# Patient Record
Sex: FEMALE | Race: BLACK OR AFRICAN AMERICAN | Employment: UNEMPLOYED | URBAN - METROPOLITAN AREA
[De-identification: names, ages, dates, MRNs, and addresses within clinical notes are randomized per-mention and may not be internally consistent; named-entity substitution may affect disease eponyms.]

---

## 2017-01-03 ENCOUNTER — TELEPHONE (OUTPATIENT)
Dept: PEDIATRICS | Facility: CLINIC | Age: 47
End: 2017-01-03

## 2017-01-03 DIAGNOSIS — M54.50 LEFT-SIDED LOW BACK PAIN WITHOUT SCIATICA, UNSPECIFIED CHRONICITY: Primary | ICD-10-CM

## 2017-01-03 NOTE — TELEPHONE ENCOUNTER
Would not recommend taking Aleve while on plavix due to bleeding risk.  Tylenol is OK.  Would recommend physical therapy-ordered  Pt should schedule foillow-up if not improving after a few sessions of PT

## 2017-01-03 NOTE — TELEPHONE ENCOUNTER
Patient reports that she had a fall one year ago,and she landed on her buttocks.  Since the fall has noted low to mid back pain on left side of her back that comes and goes.  Also notes pain in left leg at times if she sits too long.  Has tried Tylenol and Aleve-two tabs daily if having pain.  Reports that the Aleve works fairly well.  Patient is on Plavix 75 mg daily.  Is there something else she could try that would offer pain relief, but not make her too sleepy.  Flexeril made her sleepy.  If not, she will continue with the Tylenol and Aleve.  LATISHA Cruz RN

## 2017-01-11 ENCOUNTER — THERAPY VISIT (OUTPATIENT)
Dept: PHYSICAL THERAPY | Facility: CLINIC | Age: 47
End: 2017-01-11
Payer: COMMERCIAL

## 2017-01-11 DIAGNOSIS — M54.50 LUMBAGO: Primary | ICD-10-CM

## 2017-01-11 PROCEDURE — G8978 MOBILITY CURRENT STATUS: HCPCS | Mod: GP | Performed by: PHYSICAL THERAPIST

## 2017-01-11 PROCEDURE — 97161 PT EVAL LOW COMPLEX 20 MIN: CPT | Mod: GP | Performed by: PHYSICAL THERAPIST

## 2017-01-11 PROCEDURE — G8979 MOBILITY GOAL STATUS: HCPCS | Mod: GP | Performed by: PHYSICAL THERAPIST

## 2017-01-11 PROCEDURE — 97110 THERAPEUTIC EXERCISES: CPT | Mod: GP | Performed by: PHYSICAL THERAPIST

## 2017-01-11 NOTE — PROGRESS NOTES
Subjective:                                       Pertinent medical history includes:  Diabetes, overweight, high blood pressure, stroke, anemia and other.    Other surgeries include:  Orthopedic surgery.  Current medications:  High blood pressure medication.                  Oswestry Score: 37.78 %                 Objective:    System    Physical Exam    General     ROS    Assessment/Plan:

## 2017-01-11 NOTE — Clinical Note
DEPARTMENT OF HEALTH AND HUMAN SERVICES  CENTERS FOR MEDICARE & MEDICAID SERVICES    PLAN/UPDATED PLAN OF PROGRESS FOR OUTPATIENT REHABILITATION    PATIENTS NAME:  Kelly Polk   : 1970  PR[facility] NUMBER:    [id number]  [id number]  PROVIDER NAME: [employer] [facility]  MEDICAL RECORD NUMBER: [id number]   START OF CARE DATE:  SOC Date: 17   TYPE:  PT  PRIMARY/TREATMENT DIAGNOSIS: (Pertinent Medical Diagnosis)  Lumbago  VISITS FROM START OF CARE:  Rxs Used: 1     [facility] [employer] [facility] Initial Evaluation    Subjective:  Kelly Polk is a 46 year old female with a lumbar condition.  Condition occurred with:  A fall/slip.  Condition occurred: in the community.  This is a chronic condition  Patient reports she has had low back pain since she fell on 2015 landing on her buttock.  Patient c/o pain with prolong sitting and standing.  Patient had therapy last year for treatment of low back pain.  Patient has left side hemiplegia d/t a stroke .    Patient reports pain:  Central lumbar spine.  Radiates to:  No radiation.  Pain is described as aching and is intermittent and reported as 3/10.  Associated symptoms:  Loss of motion/stiffness.   Symptoms are exacerbated by sitting and standing and relieved by activity/movement, rest and analgesics. Physical therapy order date 1/3/2017. Since onset symptoms are gradually improving.  Special testing: none.  Previous treatment includes physical therapy.  There was moderate improvement following previous treatment.  General health as reported by patient is poor.                Pertinent medical history includes:  Diabetes, overweight, high blood pressure, stroke, anemia and other.  Other surgeries include:  Orthopedic surgery.  Current medications: High blood pressure medication.        Oswestry Score: 37.78 %                 Objective:  Gait:  Hemiplegia  Assistive Devices:  Cane  Lumbar/SI Evaluation ROM:    AROM Lumbar:    Flexion:        66% (+/-)  Ext:                    20% (+) - pain and ROM improve with repeated movement   Side Bend:        Left:     Right:   Rotation:           Left:     Right:   Side Glide:        Left:     Right:         Strength: Poor core strength  Lumbar Myotomes:  not assessed          Kelly Polk                 Assessment/Plan:    Patient is a 46 year old female with lumbar complaints.    Patient has the following significant findings with corresponding treatment plan.                Diagnosis 1:  Lumbar derangement  Pain -  self management, education and home program  Decreased ROM/flexibility - therapeutic exercise, therapeutic activity and home program  Decreased strength - therapeutic exercise, therapeutic activities and home program  Impaired muscle performance - neuro re-education and home program  Decreased function - therapeutic activities and home program    Therapy Evaluation Codes:   1) History comprised of:   Personal factors that impact the plan of care:      None.    Comorbidity factors that impact the plan of care are:      Stroke.     Medications impacting care: High blood pressure and Heparin/coumadin.  2) Examination of Body Systems comprised of:   Body structures and functions that impact the plan of care:      L side hemiplegia.   Activity limitations that impact the plan of care are:      Bending, Dressing, Grasping, Lifting, Squatting/kneeling, Stairs, Standing and Walking.  3) Clinical presentation characteristics are:   Stable/Uncomplicated.  4) Decision-Making    Low complexity using standardized patient assessment instrument and/or measureable assessment of functional outcome.  Cumulative Therapy Evaluation is: Low complexity.    Previous and current functional limitations:  (See Goal Flow Sheet for this information)    Short term and Long term goals: (See Goal Flow Sheet for this information)   Communication ability:  Patient appears to be able to clearly communicate and  "understand verbal and written communication and follow directions correctly.  Treatment Explanation - The following has been discussed with the patient:   RX ordered/plan of care  Anticipated outcomes  Possible risks and side effects  This patient would benefit from PT intervention to resume normal activities.   Rehab potential is good.  Frequency:  1 X week, once daily  Duration:  for 6 weeks  Discharge Plan:  Achieve all LTG.        Kelly Polk           Independent in home treatment program.  Reach maximal therapeutic benefit.    Please refer to the daily flowsheet for treatment today, total treatment time and time spent performing 1:1 timed codes.     Caregiver Signature/Credentials _____________________________ Date ________       Treating Provider: Kosta Mcdaniel PT   I have reviewed and certified the need for these services and plan of treatment while under my care.        PHYSICIAN'S SIGNATURE:   _________________________________________  Date___________   Willis Chen    Certification period:  Beginning of Cert date period: 01/11/17 to  End of Cert period date: 04/10/17     Functional Level Progress Report: Please see attached \"Goal Flow sheet for Functional level.\"    ____X____ Continue Services or       ________ DC Services                Service dates: From  SOC Date: 01/11/17 date to present                           "

## 2017-01-11 NOTE — PROGRESS NOTES
[facility] Initial Evaluation      Subjective:    Kelly Polk is a 46 year old female with a lumbar condition.  Condition occurred with:  A fall/slip.  Condition occurred: in the community.  This is a chronic condition  Patient reports she has had low back pain since she fell on 12/19/2015 landing on her buttock.  Patient c/o pain with prolong sitting and standing.  Patient had therapy last year for treatment of low back pain.  Patient has left side hemiplegia d/t a stroke 2012.  Physical therapy order date 1/3/2017.    Patient reports pain:  Central lumbar spine.  Radiates to:  No radiation.  Pain is described as aching and is intermittent and reported as 3/10.  Associated symptoms:  Loss of motion/stiffness.   Symptoms are exacerbated by sitting and standing and relieved by activity/movement, rest and analgesics.  Since onset symptoms are gradually improving.  Special testing: none.  Previous treatment includes physical therapy.  There was moderate improvement following previous treatment.  General health as reported by patient is poor.                                            Objective:      Gait:  Hemiplegia  Assistive Devices:  Cane                 Lumbar/SI Evaluation  ROM:    AROM Lumbar:   Flexion:        66% (+/-)  Ext:                    20% (+) - pain and ROM improve with repeated movement   Side Bend:        Left:     Right:   Rotation:           Left:     Right:   Side Glide:        Left:     Right:         Strength: Poor core strength  Lumbar Myotomes:  not assessed                                                                         General     ROS    Assessment/Plan:      Patient is a 46 year old female with lumbar complaints.    Patient has the following significant findings with corresponding treatment plan.                Diagnosis 1:  Lumbar derangement  Pain -  self management, education and home program  Decreased ROM/flexibility - therapeutic exercise, therapeutic  activity and home program  Decreased strength - therapeutic exercise, therapeutic activities and home program  Impaired muscle performance - neuro re-education and home program  Decreased function - therapeutic activities and home program    Therapy Evaluation Codes:   1) History comprised of:   Personal factors that impact the plan of care:      None.    Comorbidity factors that impact the plan of care are:      Stroke.     Medications impacting care: High blood pressure and Heparin/coumadin.  2) Examination of Body Systems comprised of:   Body structures and functions that impact the plan of care:      L side hemiplegia.   Activity limitations that impact the plan of care are:      Bending, Dressing, Grasping, Lifting, Squatting/kneeling, Stairs, Standing and Walking.  3) Clinical presentation characteristics are:   Stable/Uncomplicated.  4) Decision-Making    Low complexity using standardized patient assessment instrument and/or measureable assessment of functional outcome.  Cumulative Therapy Evaluation is: Low complexity.    Previous and current functional limitations:  (See Goal Flow Sheet for this information)    Short term and Long term goals: (See Goal Flow Sheet for this information)     Communication ability:  Patient appears to be able to clearly communicate and understand verbal and written communication and follow directions correctly.  Treatment Explanation - The following has been discussed with the patient:   RX ordered/plan of care  Anticipated outcomes  Possible risks and side effects  This patient would benefit from PT intervention to resume normal activities.   Rehab potential is good.    Frequency:  1 X week, once daily  Duration:  for 6 weeks  Discharge Plan:  Achieve all LTG.  Independent in home treatment program.  Reach maximal therapeutic benefit.    Please refer to the daily flowsheet for treatment today, total treatment time and time spent performing 1:1 timed codes.

## 2017-01-18 ENCOUNTER — THERAPY VISIT (OUTPATIENT)
Dept: PHYSICAL THERAPY | Facility: CLINIC | Age: 47
End: 2017-01-18
Payer: COMMERCIAL

## 2017-01-18 DIAGNOSIS — M54.50 LUMBAGO: Primary | ICD-10-CM

## 2017-01-18 PROCEDURE — 97110 THERAPEUTIC EXERCISES: CPT | Mod: GP | Performed by: PHYSICAL THERAPIST

## 2017-01-18 PROCEDURE — 97112 NEUROMUSCULAR REEDUCATION: CPT | Mod: GP | Performed by: PHYSICAL THERAPIST

## 2017-01-25 ENCOUNTER — THERAPY VISIT (OUTPATIENT)
Dept: PHYSICAL THERAPY | Facility: CLINIC | Age: 47
End: 2017-01-25
Payer: COMMERCIAL

## 2017-01-25 DIAGNOSIS — M54.50 LUMBAGO: Primary | ICD-10-CM

## 2017-01-25 PROCEDURE — 97110 THERAPEUTIC EXERCISES: CPT | Mod: GP | Performed by: PHYSICAL THERAPIST

## 2017-01-25 PROCEDURE — 97112 NEUROMUSCULAR REEDUCATION: CPT | Mod: GP | Performed by: PHYSICAL THERAPIST

## 2017-02-01 ENCOUNTER — THERAPY VISIT (OUTPATIENT)
Dept: PHYSICAL THERAPY | Facility: CLINIC | Age: 47
End: 2017-02-01
Payer: COMMERCIAL

## 2017-02-01 DIAGNOSIS — M54.50 LUMBAGO: Primary | ICD-10-CM

## 2017-02-01 PROCEDURE — 97112 NEUROMUSCULAR REEDUCATION: CPT | Mod: GP | Performed by: PHYSICAL THERAPIST

## 2017-02-01 PROCEDURE — 97110 THERAPEUTIC EXERCISES: CPT | Mod: GP | Performed by: PHYSICAL THERAPIST

## 2017-02-07 DIAGNOSIS — E11.40 TYPE 2 DIABETES MELLITUS WITH DIABETIC NEUROPATHY, WITHOUT LONG-TERM CURRENT USE OF INSULIN (H): Primary | ICD-10-CM

## 2017-02-07 NOTE — TELEPHONE ENCOUNTER
This medication was given 90 days at a time, and falls off of the list with each refill.   Please sign if ok, due for 6 month check in march.  Blanca Ann, RN  Triage Nurse

## 2017-02-07 NOTE — TELEPHONE ENCOUNTER
sitagliptin (JANUVIA) 50 MG TABLET - MEDICATION WAS IN HISTORY/DISCONTINUED  Last Written Prescription Date: 1/22/16  Last Fill Quantity: 90, # refills: 0  Last Office Visit with G, P or [facility] prescribing provider:  9/28/16        BP Readings from Last 3 Encounters:   09/28/16 126/68   09/14/16 144/91   08/31/16 134/84     MICROL     3050   1/27/2016  No results found for this basename: microalbumin  CREATININE   Date Value Ref Range Status   09/28/2016 1.30* 0.52 - 1.04 mg/dL Final   ]  GFR ESTIMATE   Date Value Ref Range Status   09/28/2016 44* >60 mL/min/1.7m2 Final     Comment:     Non  GFR Calc   05/17/2016 47* >60 mL/min/1.7m2 Final     Comment:     Non  GFR Calc   02/19/2016 51* >60 mL/min/1.7m2 Final     Comment:     Non  GFR Calc     GFR ESTIMATE IF BLACK   Date Value Ref Range Status   09/28/2016 54* >60 mL/min/1.7m2 Final     Comment:      GFR Calc   05/17/2016 57* >60 mL/min/1.7m2 Final     Comment:      GFR Calc   02/19/2016 62 >60 mL/min/1.7m2 Final     Comment:      GFR Calc     CHOL      179   8/12/2016  HDL       55   8/12/2016  LDL       93   8/12/2016  TRIG      153   8/12/2016  CHOLHDLRATIO      3.3   8/12/2016  AST       23   9/28/2016  ALT       22   9/28/2016  A1C      6.2   9/28/2016  A1C      6.9   9/15/2016  A1C      6.2   5/17/2016  A1C      7.1   1/27/2016  A1C      7.6   10/7/2015  POTASSIUM   Date Value Ref Range Status   09/28/2016 4.1 3.4 - 5.3 mmol/L Final

## 2017-02-15 ENCOUNTER — THERAPY VISIT (OUTPATIENT)
Dept: PHYSICAL THERAPY | Facility: CLINIC | Age: 47
End: 2017-02-15
Payer: COMMERCIAL

## 2017-02-15 DIAGNOSIS — M54.50 LUMBAGO: ICD-10-CM

## 2017-02-15 PROCEDURE — 97112 NEUROMUSCULAR REEDUCATION: CPT | Mod: GP | Performed by: PHYSICAL THERAPIST

## 2017-02-15 PROCEDURE — 97110 THERAPEUTIC EXERCISES: CPT | Mod: GP | Performed by: PHYSICAL THERAPIST

## 2017-03-01 ENCOUNTER — THERAPY VISIT (OUTPATIENT)
Dept: PHYSICAL THERAPY | Facility: CLINIC | Age: 47
End: 2017-03-01
Payer: COMMERCIAL

## 2017-03-01 DIAGNOSIS — M54.50 LUMBAGO: ICD-10-CM

## 2017-03-01 PROCEDURE — 97110 THERAPEUTIC EXERCISES: CPT | Mod: GP | Performed by: PHYSICAL THERAPIST

## 2017-03-01 PROCEDURE — 97112 NEUROMUSCULAR REEDUCATION: CPT | Mod: GP | Performed by: PHYSICAL THERAPIST

## 2017-03-01 NOTE — PROGRESS NOTES
Subjective:    HPI                    Objective:    System    Physical Exam    General     ROS    Assessment/Plan:      DISCHARGE REPORT    Progress reporting period is from 1/12/2017 to 3/1/2017.       SUBJECTIVE  Subjective changes noted by patient:  Patient reports low back pain has improved significantly.   She has less pain with sitting and standing.  Intensity and frequency of pain is less.  HEP is going well.  Walking is impaired d/t hemiplegia.  Current pain level is 0/10  .     Previous pain level was  3/10  .   Changes in function:  Yes (See Goal flowsheet attached for changes in current functional level)  Adverse reaction to treatment or activity: None    OBJECTIVE  AROM Lumbar:   Flexion: 80% supported  Ext: 80% supported      Strength: Fair core strength, improving    ASSESSMENT/PLAN  Updated problem list and treatment plan: Diagnosis 1:  Lumbar derangement  Decreased ROM/flexibility - home program  Decreased strength - home program  Impaired muscle performance - home program  Decreased function - home program  STG/LTGs have been met or progress has been made towards goals:  Yes (See Goal flow sheet completed today.)  Assessment of Progress: The patient's condition is improving.  The patient has met all of their long term goals.  Self Management Plans:  Patient has been instructed in a home treatment program.  Patient is independent in a home treatment program.    Kelly continues to require the following intervention to meet STG and LTG's:  PT intervention is no longer required to meet STG/LTG.    Recommendations:  This patient is ready to be discharged from therapy and continue their home treatment program.    Please refer to the daily flowsheet for treatment today, total treatment time and time spent performing 1:1 timed codes.

## 2017-03-08 DIAGNOSIS — E11.40 TYPE 2 DIABETES MELLITUS WITH DIABETIC NEUROPATHY, WITHOUT LONG-TERM CURRENT USE OF INSULIN (H): Primary | ICD-10-CM

## 2017-03-08 NOTE — TELEPHONE ENCOUNTER
metFORMIN (GLUCOPHAGE-XR) 500 MG 24 hr tablet  Last Written Prescription Date: 9/28/16  Last Fill Quantity: 90, # refills: 1  Last Office Visit with G, P or [facility] prescribing provider:  9/28/16        BP Readings from Last 3 Encounters:   09/28/16 126/68   09/14/16 (!) 144/91   08/31/16 134/84     Lab Results   Component Value Date    MICROL 3050 01/27/2016     No results found for: MICROALBUMIN  Creatinine   Date Value Ref Range Status   09/28/2016 1.30 (H) 0.52 - 1.04 mg/dL Final   ]  GFR Estimate   Date Value Ref Range Status   09/28/2016 44 (L) >60 mL/min/1.7m2 Final     Comment:     Non  GFR Calc   05/17/2016 47 (L) >60 mL/min/1.7m2 Final     Comment:     Non  GFR Calc   02/19/2016 51 (L) >60 mL/min/1.7m2 Final     Comment:     Non  GFR Calc     GFR Estimate If Black   Date Value Ref Range Status   09/28/2016 54 (L) >60 mL/min/1.7m2 Final     Comment:      GFR Calc   05/17/2016 57 (L) >60 mL/min/1.7m2 Final     Comment:      GFR Calc   02/19/2016 62 >60 mL/min/1.7m2 Final     Comment:      GFR Calc     Lab Results   Component Value Date    CHOL 179 08/12/2016     Lab Results   Component Value Date    HDL 55 08/12/2016     Lab Results   Component Value Date    LDL 93 08/12/2016     Lab Results   Component Value Date    TRIG 153 08/12/2016     Lab Results   Component Value Date    CHOLHDLRATIO 3.3 08/12/2016     Lab Results   Component Value Date    AST 23 09/28/2016     Lab Results   Component Value Date    ALT 22 09/28/2016     Lab Results   Component Value Date    A1C 6.2 09/28/2016    A1C 6.9 09/15/2016    A1C 6.2 05/17/2016    A1C 7.1 01/27/2016    A1C 7.6 10/07/2015     Potassium   Date Value Ref Range Status   09/28/2016 4.1 3.4 - 5.3 mmol/L Final

## 2017-03-09 RX ORDER — METFORMIN HCL 500 MG
500 TABLET, EXTENDED RELEASE 24 HR ORAL
Qty: 90 TABLET | Refills: 0 | Status: SHIPPED | OUTPATIENT
Start: 2017-03-09 | End: 2017-03-27

## 2017-03-09 NOTE — TELEPHONE ENCOUNTER
Routing refill request to provider for review/approval because:  Labs out of range:  Cr, GFR. Due for 6 month diabetes check?  Please advise.  Blanca Ann, ANNI  Triage Nurse

## 2017-03-14 ENCOUNTER — TRANSFERRED RECORDS (OUTPATIENT)
Dept: HEALTH INFORMATION MANAGEMENT | Facility: CLINIC | Age: 47
End: 2017-03-14

## 2017-03-17 DIAGNOSIS — I10 ESSENTIAL HYPERTENSION: ICD-10-CM

## 2017-03-17 DIAGNOSIS — I63.9 CEREBROVASCULAR ACCIDENT (CVA), UNSPECIFIED MECHANISM (H): Primary | ICD-10-CM

## 2017-03-17 NOTE — TELEPHONE ENCOUNTER
amLODIPine (NORVASC) 10 MG tablet (Discontinued)      Last Written Prescription Date: 02-  Last Fill Quantity: 90, # refills: 4    Last Office Visit with [facility], [facility] or [facility] prescribing provider:  09-   Future Office Visit:        BP Readings from Last 3 Encounters:   09/28/16 126/68   09/14/16 (!) 144/91   08/31/16 134/84

## 2017-03-21 RX ORDER — CLOPIDOGREL BISULFATE 75 MG/1
75 TABLET ORAL DAILY
Qty: 90 TABLET | Refills: 0 | Status: SHIPPED | OUTPATIENT
Start: 2017-03-21 | End: 2017-03-27

## 2017-03-21 RX ORDER — AMLODIPINE BESYLATE 10 MG/1
10 TABLET ORAL DAILY
Qty: 90 TABLET | Refills: 0 | Status: SHIPPED | OUTPATIENT
Start: 2017-03-21 | End: 2017-03-27

## 2017-03-21 NOTE — TELEPHONE ENCOUNTER
clopidogrel (PLAVIX) 75 MG           Last Written Prescription Date: 2/23/2016  Last Fill Quantity: 90, # refills: 3    Last Office Visit with [facility] or [facility] prescribing provider:  9/18/2016   Future Office Visit:       Lab Results   Component Value Date    WBC 11.5 09/07/2016     Lab Results   Component Value Date    RBC 4.65 09/07/2016     Lab Results   Component Value Date    HGB 12.1 09/07/2016     Lab Results   Component Value Date    HCT 37.7 09/07/2016     No components found for: MCT  Lab Results   Component Value Date    MCV 81 09/07/2016     Lab Results   Component Value Date    MCH 26.0 09/07/2016     Lab Results   Component Value Date    MCHC 32.1 09/07/2016     Lab Results   Component Value Date    RDW 14.5 09/07/2016     Lab Results   Component Value Date     09/07/2016     Lab Results   Component Value Date    AST 23 09/28/2016     Lab Results   Component Value Date    ALT 22 09/28/2016     Creatinine   Date Value Ref Range Status   09/28/2016 1.30 (H) 0.52 - 1.04 mg/dL Final   ]

## 2017-03-21 NOTE — TELEPHONE ENCOUNTER
Routing refill request to provider for review/approval because:  Labs not current:  Cr elevated.  Norvasc discontinued. Please sign if ok.  Blanca Ann, RN  Triage Nurse

## 2017-03-27 ENCOUNTER — OFFICE VISIT (OUTPATIENT)
Dept: PEDIATRICS | Facility: CLINIC | Age: 47
End: 2017-03-27
Payer: COMMERCIAL

## 2017-03-27 VITALS
WEIGHT: 199 LBS | TEMPERATURE: 98.3 F | DIASTOLIC BLOOD PRESSURE: 80 MMHG | HEIGHT: 63 IN | BODY MASS INDEX: 35.26 KG/M2 | HEART RATE: 93 BPM | OXYGEN SATURATION: 99 % | SYSTOLIC BLOOD PRESSURE: 124 MMHG

## 2017-03-27 DIAGNOSIS — Z86.2 HISTORY OF IRON DEFICIENCY ANEMIA: ICD-10-CM

## 2017-03-27 DIAGNOSIS — G81.94 LEFT HEMIPLEGIA (H): ICD-10-CM

## 2017-03-27 DIAGNOSIS — I10 ESSENTIAL HYPERTENSION WITH GOAL BLOOD PRESSURE LESS THAN 140/90: ICD-10-CM

## 2017-03-27 DIAGNOSIS — I63.9 CEREBROVASCULAR ACCIDENT (CVA), UNSPECIFIED MECHANISM (H): ICD-10-CM

## 2017-03-27 DIAGNOSIS — E78.5 HYPERLIPIDEMIA WITH TARGET LDL LESS THAN 100: ICD-10-CM

## 2017-03-27 DIAGNOSIS — E11.40 TYPE 2 DIABETES MELLITUS WITH DIABETIC NEUROPATHY, WITHOUT LONG-TERM CURRENT USE OF INSULIN (H): Primary | ICD-10-CM

## 2017-03-27 LAB — HBA1C MFR BLD: 6.2 % (ref 4.3–6)

## 2017-03-27 PROCEDURE — 99214 OFFICE O/P EST MOD 30 MIN: CPT | Performed by: INTERNAL MEDICINE

## 2017-03-27 PROCEDURE — 36415 COLL VENOUS BLD VENIPUNCTURE: CPT | Performed by: INTERNAL MEDICINE

## 2017-03-27 PROCEDURE — 83036 HEMOGLOBIN GLYCOSYLATED A1C: CPT | Performed by: INTERNAL MEDICINE

## 2017-03-27 PROCEDURE — 80053 COMPREHEN METABOLIC PANEL: CPT | Performed by: INTERNAL MEDICINE

## 2017-03-27 PROCEDURE — 82043 UR ALBUMIN QUANTITATIVE: CPT | Performed by: INTERNAL MEDICINE

## 2017-03-27 RX ORDER — LISINOPRIL 20 MG/1
20 TABLET ORAL DAILY
Qty: 90 TABLET | Refills: 3 | Status: SHIPPED | OUTPATIENT
Start: 2017-03-27 | End: 2017-04-13

## 2017-03-27 RX ORDER — GLIMEPIRIDE 2 MG/1
2 TABLET ORAL
Qty: 90 TABLET | Refills: 0 | Status: SHIPPED | OUTPATIENT
Start: 2017-03-27 | End: 2017-06-28

## 2017-03-27 RX ORDER — FERROUS SULFATE 325(65) MG
325 TABLET ORAL 2 TIMES DAILY
Qty: 60 TABLET | Refills: 2 | Status: CANCELLED | OUTPATIENT
Start: 2017-03-27

## 2017-03-27 RX ORDER — CLOPIDOGREL BISULFATE 75 MG/1
75 TABLET ORAL DAILY
Qty: 90 TABLET | Refills: 3 | Status: SHIPPED | OUTPATIENT
Start: 2017-03-27 | End: 2018-02-09

## 2017-03-27 RX ORDER — ATORVASTATIN CALCIUM 10 MG/1
10 TABLET, FILM COATED ORAL DAILY
Qty: 90 TABLET | Refills: 3 | Status: SHIPPED | OUTPATIENT
Start: 2017-03-27 | End: 2017-07-02

## 2017-03-27 RX ORDER — METFORMIN HCL 500 MG
500 TABLET, EXTENDED RELEASE 24 HR ORAL
Qty: 90 TABLET | Refills: 0 | Status: SHIPPED | OUTPATIENT
Start: 2017-03-27 | End: 2017-06-28

## 2017-03-27 RX ORDER — AMLODIPINE BESYLATE 10 MG/1
10 TABLET ORAL DAILY
Qty: 90 TABLET | Refills: 3 | Status: SHIPPED | OUTPATIENT
Start: 2017-03-27 | End: 2018-02-09

## 2017-03-27 RX ORDER — METHYLDOPA 250 MG/1
250 TABLET, FILM COATED ORAL 3 TIMES DAILY
Qty: 180 TABLET | Refills: 1 | Status: CANCELLED | OUTPATIENT
Start: 2017-03-27

## 2017-03-27 NOTE — PROGRESS NOTES
SUBJECTIVE:                                                    Kelly Polk is a 46 year old female with h/o DM and prior CVA with associated left hemiplegia, who presents to clinic today for the following health issues:    Diabetes Follow-up    Patient is checking blood sugars: twice daily.    Results are as follows:         am - 130's    Diabetic concerns: ran out of amaryl     Symptoms of hypoglycemia (low blood sugar): none     Paresthesias (numbness or burning in feet) or sores: Nh/o neuropathy     Lab Results   Component Value Date    A1C 6.2 03/27/2017    A1C 6.2 09/28/2016             Hypertension Follow-up      Outpatient blood pressures are being checked at home.  Results are 120/80    Low Salt Diet: no added salt        Hyperlipidemia Follow-Up      Rate your low fat/cholesterol diet?: fair    Taking statin?  Has not restarted    Other lipid medications/supplements?:  None  Lab Results   Component Value Date    LDL 93 08/12/2016    LDL 66 02/18/2016            Amount of exercise or physical activity: None    Problems taking medications regularly: No    Medication side effects: none    Diet: low salt and low fat/cholesterol      Patient Active Problem List   Diagnosis     Hyperlipidemia with target LDL less than 100     Left hemiplegia (H)     Fibroid uterus     HPV in female     Diabetic nephropathy with proteinuria (H)     Cerebrovascular accident (CVA), unspecified mechanism (H)     Essential hypertension with goal blood pressure less than 140/90     Lumbago     Type 2 diabetes mellitus with diabetic neuropathy, without long-term current use of insulin (H)     No past surgical history on file.    Social History   Substance Use Topics     Smoking status: Never Smoker     Smokeless tobacco: Never Used     Alcohol use No     Family History   Problem Relation Age of Onset     Breast Cancer Mother      Hypertension Brother          Current Outpatient Prescriptions   Medication Sig Dispense Refill      "amLODIPine (NORVASC) 10 MG tablet Take 1 tablet (10 mg) by mouth daily 90 tablet 3     clopidogrel (PLAVIX) 75 MG tablet Take 1 tablet (75 mg) by mouth daily 90 tablet 3     metFORMIN (GLUCOPHAGE-XR) 500 MG 24 hr tablet Take 1 tablet (500 mg) by mouth daily (with dinner) 90 tablet 0     sitagliptin (JANUVIA) 50 MG tablet Take 1 tablet (50 mg) by mouth daily (with breakfast) 90 tablet 0                     blood glucose monitoring (NO BRAND SPECIFIED) test strip Use to test blood sugars 2 times daily or as directed 180 each 0     insulin isophane human (HUMULIN N PEN) 100 UNIT/ML susp Inject 8 Units Subcutaneous At Bedtime 3 Month 1     methyldopa (ALDOMET) 250 MG tablet Take 1 tablet (250 mg) by mouth 3 times daily 180 tablet 1     order for DME Equipment being ordered: left AFO brace 1 Units 0     ASPIRIN NOT PRESCRIBED, INTENTIONAL, 1 each daily Antiplatelet medication not prescribed intentionally due to on Plavix 0 each 0     ferrous sulfate (IRON) 325 (65 FE) MG tablet Take 1 tablet (325 mg) by mouth 2 times daily 60 tablet 2     order for DME Accu-check glucometer test strips or per insurance coverage    Check QAM and  Units 3     order for DME Accu-check glucometer or per insurance coverage 1 Units 0     order for DME Accu-check glucometer test lancets, or per insurance coverage    Check QAM and  Units 3     ROS: The following systems have been completely reviewed and are negative except as noted in the HPI: CONSTITUTIONAL,  CARDIOVASCULAR, PULMONARY    OBJECTIVE:                                                    /80 (Cuff Size: Adult Regular)  Pulse 93  Temp 98.3  F (36.8  C) (Oral)  Ht 5' 3\" (1.6 m)  Wt 199 lb (90.3 kg)  SpO2 99%  BMI 35.25 kg/m2 Body mass index is 35.25 kg/(m^2).  GENERAL:  alert,  no distress  NECK: no tenderness, no adenopathy  RESP: lungs clear to auscultation - no rales, no rhonchi, no wheezes  CV: regular rates and rhythm, normal S1 S2, no S3 or S4 and no " murmur, no click or rub   Neuro: left hemiplegia  MS: extremities- no edema     ASSESSMENT/PLAN:                                                        ICD-10-CM    1. Type 2 diabetes mellitus with diabetic neuropathy, without long-term current use of insulin (H) E11.40 metFORMIN (GLUCOPHAGE-XR) 500 MG 24 hr tablet     sitagliptin (JANUVIA) 50 MG tablet     Hemoglobin A1c     Albumin Random Urine Quantitative     Comprehensive metabolic panel      Well controlled.  Continue metformin, januvia; resume Amaryl.      Prior unplanned pregnancy and SAB.  Pt has since declined tubal ligation but has continued birth control measures and the couple has no plans for additional pregnancies (which would otherwise be high risk).  Next diabetes follow-up 3 months     2. Cerebrovascular accident (CVA), unspecified mechanism (H) I63.9 clopidogrel (PLAVIX) 75 MG tablet        3. Left hemiplegia (H) G81.94 CVA-continue plavix, resume statin     4. Essential hypertension with goal blood pressure less than 140/90 I10 D/c methyldopa.  Start lisinopril 20mg       5. History of iron deficiency anemia Z86.2 Most recent blood counts and ferritin reviewed--d/c iron supplement     6. Hyperlipidemia with target LDL less than 100 E78.5 atorvastatin (LIPITOR) 10 MG tablet     Resume atorvastatin      Willis Chen MD  [facility]

## 2017-03-27 NOTE — NURSING NOTE
"Chief Complaint   Patient presents with     Diabetes       Initial /80 (Cuff Size: Adult Regular)  Pulse 93  Temp 98.3  F (36.8  C) (Oral)  Ht 5' 3\" (1.6 m)  Wt 199 lb (90.3 kg)  SpO2 99%  BMI 35.25 kg/m2 Estimated body mass index is 35.25 kg/(m^2) as calculated from the following:    Height as of this encounter: 5' 3\" (1.6 m).    Weight as of this encounter: 199 lb (90.3 kg).  Medication Reconciliation: kota Harrison CMA    "

## 2017-03-27 NOTE — MR AVS SNAPSHOT
"              After Visit Summary   3/27/2017    Kelly Polk    MRN: [id number]           Patient Information     Date Of Birth          1970        Visit Information        Provider Department      3/27/2017 2:00 PM Willis Chen MD [facility]        Today's Diagnoses     Essential hypertension        Cerebrovascular accident (CVA), unspecified mechanism (H)        Type 2 diabetes mellitus with diabetic neuropathy, without long-term current use of insulin (H)        Essential hypertension with goal blood pressure less than 140/90        History of iron deficiency anemia          Care Instructions    INSTRUCTIONS FOR TODAY:     diabetes: resume Amaryl, continue metformin and Januvia   high blood pressure: stop methyl dopa, resume lisinopril 20mg daily   next diabetes visit in 3 months    Dr Chen          Follow-ups after your visit        Who to contact     If you have questions or need follow up information about today's clinic visit or your schedule please contact [facility] directly at [phone].  Normal or non-critical lab and imaging results will be communicated to you by MyChart, letter or phone within 4 business days after the clinic has received the results. If you do not hear from us within 7 days, please contact the clinic through [employer] or phone. If you have a critical or abnormal lab result, we will notify you by phone as soon as possible.  Submit refill requests through [employer] or call your pharmacy and they will forward the refill request to us. Please allow 3 business days for your refill to be completed.          Additional Information About Your Visit        [employer] Information     [employer] lets you send messages to your doctor, view your test results, renew your prescriptions, schedule appointments and more. To sign up, go to [email or web address] . Click on \"Log in\" on the left side of the screen, which will take you to the Welcome page. Then click on " "\"Sign up Now\" on the right side of the page.     You will be asked to enter the access code listed below, as well as some personal information. Please follow the directions to create your username and password.     Your access code is: [email or web address]  Expires: 2017  2:40 PM     Your access code will  in 90 days. If you need help or a new code, please call your [facility] clinic or [phone].        Care EveryWhere ID     This is your Care EveryWhere ID. This could be used by other organizations to access your [facility] medical records  [id number]        Your Vitals Were     Pulse Temperature Height Pulse Oximetry BMI (Body Mass Index)       93 98.3  F (36.8  C) (Oral) 5' 3\" (1.6 m) 99% 35.25 kg/m2        Blood Pressure from Last 3 Encounters:   17 124/80   16 126/68   16 (!) 144/91    Weight from Last 3 Encounters:   17 199 lb (90.3 kg)   16 199 lb 11.2 oz (90.6 kg)   16 201 lb (91.2 kg)              We Performed the Following     Albumin Random Urine Quantitative     Comprehensive metabolic panel     Hemoglobin A1c          Today's Medication Changes          These changes are accurate as of: 3/27/17  2:40 PM.  If you have any questions, ask your nurse or doctor.               Start taking these medicines.        Dose/Directions    lisinopril 20 MG tablet   Commonly known as:  PRINIVIL/ZESTRIL   Used for:  Essential hypertension   Started by:  Willis Chen MD        Dose:  20 mg   Take 1 tablet (20 mg) by mouth daily   Quantity:  90 tablet   Refills:  3         These medicines have changed or have updated prescriptions.        Dose/Directions    amLODIPine 10 MG tablet   Commonly known as:  NORVASC   This may have changed:  additional instructions   Used for:  Essential hypertension   Changed by:  Willis Chen MD        Dose:  10 mg   Take 1 tablet (10 mg) by mouth daily   Quantity:  90 tablet   Refills:  3       clopidogrel 75 MG tablet   Commonly known " as:  PLAVIX   This may have changed:  additional instructions   Used for:  Cerebrovascular accident (CVA), unspecified mechanism (H)   Changed by:  Willis Chen MD        Dose:  75 mg   Take 1 tablet (75 mg) by mouth daily   Quantity:  90 tablet   Refills:  3       metFORMIN 500 MG 24 hr tablet   Commonly known as:  GLUCOPHAGE-XR   This may have changed:  additional instructions   Used for:  Type 2 diabetes mellitus with diabetic neuropathy, without long-term current use of insulin (H)   Changed by:  Willis Chen MD        Dose:  500 mg   Take 1 tablet (500 mg) by mouth daily (with dinner)   Quantity:  90 tablet   Refills:  0            Where to get your medicines      These medications were sent to [facility] - [address], [facility] [address]     Phone:  [phone]     amLODIPine 10 MG tablet    clopidogrel 75 MG tablet    glimepiride 2 MG tablet    lisinopril 20 MG tablet    metFORMIN 500 MG 24 hr tablet    sitagliptin 50 MG tablet                Primary Care Provider Office Phone # Fax #    Willis Chen [phone]       [address] [place] [address]        Thank you!     Thank you for choosing [facility]  for your care. Our goal is always to provide you with excellent care. Hearing back from our patients is one way we can continue to improve our services. Please take a few minutes to complete the written survey that you may receive in the mail after your visit with us. Thank you!             Your Updated Medication List - Protect others around you: Learn how to safely use, store and throw away your medicines at www.disposemymeds.org.          This list is accurate as of: 3/27/17  2:40 PM.  Always use your most recent med list.                   Brand Name Dispense Instructions for use    amLODIPine 10 MG tablet    NORVASC    90 tablet    Take 1 tablet  (10 mg) by mouth daily       ASPIRIN NOT PRESCRIBED    INTENTIONAL    0 each    1 each daily Antiplatelet medication not prescribed intentionally due to on Plavix       blood glucose monitoring test strip    no brand specified    180 each    Use to test blood sugars 2 times daily or as directed       clopidogrel 75 MG tablet    PLAVIX    90 tablet    Take 1 tablet (75 mg) by mouth daily       ferrous sulfate 325 (65 FE) MG tablet    IRON    60 tablet    Take 1 tablet (325 mg) by mouth 2 times daily       glimepiride 2 MG tablet    AMARYL    90 tablet    Take 1 tablet (2 mg) by mouth every morning (before breakfast)       HumuLIN N  UNIT/ML injection   Generic drug:  insulin isophane human     3 Month    Inject 8 Units Subcutaneous At Bedtime       lisinopril 20 MG tablet    PRINIVIL/ZESTRIL    90 tablet    Take 1 tablet (20 mg) by mouth daily       metFORMIN 500 MG 24 hr tablet    GLUCOPHAGE-XR    90 tablet    Take 1 tablet (500 mg) by mouth daily (with dinner)       methyldopa 250 MG tablet    ALDOMET    180 tablet    Take 1 tablet (250 mg) by mouth 3 times daily       * order for DME     1 Units    Accu-check glucometer or per insurance coverage       * order for DME     180 Units    Accu-check glucometer test lancets, or per insurance coverage  Check QAM and QPM       * order for DME     180 Units    Accu-check glucometer test strips or per insurance coverage  Check QAM and QPM       * order for DME     1 Units    Equipment being ordered: left AFO brace       sitagliptin 50 MG tablet    JANUVIA    90 tablet    Take 1 tablet (50 mg) by mouth daily (with breakfast)       * Notice:  This list has 4 medication(s) that are the same as other medications prescribed for you. Read the directions carefully, and ask your doctor or other care provider to review them with you.

## 2017-03-27 NOTE — PATIENT INSTRUCTIONS
INSTRUCTIONS FOR TODAY:     diabetes: resume Amaryl, continue metformin and Januvia   high blood pressure: stop methyl dopa, resume lisinopril 20mg daily   next diabetes visit in 3 months    Dr Chen

## 2017-03-28 LAB
ALBUMIN SERPL-MCNC: 3.6 G/DL (ref 3.4–5)
ALP SERPL-CCNC: 63 U/L (ref 40–150)
ALT SERPL W P-5'-P-CCNC: 20 U/L (ref 0–50)
ANION GAP SERPL CALCULATED.3IONS-SCNC: 8 MMOL/L (ref 3–14)
AST SERPL W P-5'-P-CCNC: 17 U/L (ref 0–45)
BILIRUB SERPL-MCNC: 0.3 MG/DL (ref 0.2–1.3)
BUN SERPL-MCNC: 15 MG/DL (ref 7–30)
CALCIUM SERPL-MCNC: 8.8 MG/DL (ref 8.5–10.1)
CHLORIDE SERPL-SCNC: 106 MMOL/L (ref 94–109)
CO2 SERPL-SCNC: 26 MMOL/L (ref 20–32)
CREAT SERPL-MCNC: 1.27 MG/DL (ref 0.52–1.04)
CREAT UR-MCNC: 170 MG/DL
GFR SERPL CREATININE-BSD FRML MDRD: 45 ML/MIN/1.7M2
GLUCOSE SERPL-MCNC: 100 MG/DL (ref 70–99)
MICROALBUMIN UR-MCNC: 2440 MG/L
MICROALBUMIN/CREAT UR: 1435.29 MG/G CR (ref 0–25)
POTASSIUM SERPL-SCNC: 4.2 MMOL/L (ref 3.4–5.3)
PROT SERPL-MCNC: 8 G/DL (ref 6.8–8.8)
SODIUM SERPL-SCNC: 140 MMOL/L (ref 133–144)

## 2017-04-13 ENCOUNTER — TELEPHONE (OUTPATIENT)
Dept: PEDIATRICS | Facility: CLINIC | Age: 47
End: 2017-04-13

## 2017-04-13 DIAGNOSIS — I10 ESSENTIAL HYPERTENSION WITH GOAL BLOOD PRESSURE LESS THAN 140/90: Primary | ICD-10-CM

## 2017-04-13 RX ORDER — LOSARTAN POTASSIUM 50 MG/1
50 TABLET ORAL DAILY
Qty: 30 TABLET | Refills: 1 | Status: SHIPPED | OUTPATIENT
Start: 2017-04-13 | End: 2017-06-28

## 2017-04-13 NOTE — TELEPHONE ENCOUNTER
Script sent for losartan to replace lisinopril.  She can start that when she would next take her lisinopril.   Senia Skinner MD

## 2017-04-13 NOTE — TELEPHONE ENCOUNTER
"Pt notifies the following:     - says that she was on lisinopril in the past & d/c because of the SE(bloated)  - when she stopped lisinopril her bloated feeling went away  -  restarted lisinopril 20 mg qd on 03/27  - has been taking lisinopril for a week now  - has been experiencing severe bloating, nausea & feeling \"sick to my stomach\"   - able to keep food & fluids down  - denies fever, chills, dizziness, HA, vomiting, diarrhea, constipation, cough, hypotension sx's   -  resumed her on Amaryl on 03/27 as well  - pt is planning to d/c lisinopril from tomorrow   - requesting alternate for lisinopril    Pt is willing to wait till Monday for pcp to address. Sending to pcp & covering providers as FYI. Please advise.    Inderjit, RN  Triage Nurse          "

## 2017-04-13 NOTE — TELEPHONE ENCOUNTER
Patient notified and voices understanding of instructions.  She will stop the Lisinopril.  No further questions.  Will call back if any other questions or concerns.  LATISHA Cruz RN

## 2017-04-13 NOTE — TELEPHONE ENCOUNTER
Reviewed chart. Has been on multiple different BP meds in the past. Would be best to wait for Dr. Chen. If she has some methyl dopa left, can restart this until Dr. Chen is able to address next week. If she is out, I'm happy to send a small supply to get her through.    Please let know.  Nohemy Monique MD  Internal Medicine/Pediatrics

## 2017-04-27 ENCOUNTER — TELEPHONE (OUTPATIENT)
Dept: PEDIATRICS | Facility: CLINIC | Age: 47
End: 2017-04-27

## 2017-04-27 NOTE — TELEPHONE ENCOUNTER
Pt is past due for f/u pap smear.  Reminder letter was sent 01/12/17.  Spoke with pt, states she has the clinic number but just hasn't called to schedule yet.  Reiterated the importance of follow up. Pt verbalized understanding.  Sarai Lin,    Pap Tracking

## 2017-06-01 DIAGNOSIS — E11.40 TYPE 2 DIABETES MELLITUS WITH DIABETIC NEUROPATHY, WITHOUT LONG-TERM CURRENT USE OF INSULIN (H): Primary | ICD-10-CM

## 2017-06-01 NOTE — TELEPHONE ENCOUNTER
TEST STRIPS      Last Written Prescription Date: 10/13/2016  Last Fill Quantity: 180,  # refills: 0   Last Office Visit with [facility], [facility] or [facility] prescribing provider: 3/27/2017                                         Next 5 appointments (look out 90 days)     Jun 26, 2017  1:00 PM CDT   Office Visit with Willis Chen MD   [facility] ([facility])    [address] [place] [id number] [phone]

## 2017-06-02 NOTE — TELEPHONE ENCOUNTER
Prescription approved per [facility] Refill Protocol.  Payton Villalobos RN

## 2017-06-05 ENCOUNTER — TRANSFERRED RECORDS (OUTPATIENT)
Dept: HEALTH INFORMATION MANAGEMENT | Facility: CLINIC | Age: 47
End: 2017-06-05

## 2017-06-26 ENCOUNTER — TRANSFERRED RECORDS (OUTPATIENT)
Dept: HEALTH INFORMATION MANAGEMENT | Facility: CLINIC | Age: 47
End: 2017-06-26

## 2017-06-28 ENCOUNTER — OFFICE VISIT (OUTPATIENT)
Dept: PEDIATRICS | Facility: CLINIC | Age: 47
End: 2017-06-28
Payer: COMMERCIAL

## 2017-06-28 VITALS
HEART RATE: 96 BPM | OXYGEN SATURATION: 100 % | SYSTOLIC BLOOD PRESSURE: 118 MMHG | TEMPERATURE: 98.4 F | WEIGHT: 201 LBS | BODY MASS INDEX: 35.61 KG/M2 | DIASTOLIC BLOOD PRESSURE: 74 MMHG

## 2017-06-28 DIAGNOSIS — R60.9 EDEMA, UNSPECIFIED TYPE: ICD-10-CM

## 2017-06-28 DIAGNOSIS — E11.21 TYPE 2 DIABETES MELLITUS WITH DIABETIC NEPHROPATHY, WITHOUT LONG-TERM CURRENT USE OF INSULIN (H): ICD-10-CM

## 2017-06-28 DIAGNOSIS — I10 ESSENTIAL HYPERTENSION WITH GOAL BLOOD PRESSURE LESS THAN 140/90: ICD-10-CM

## 2017-06-28 DIAGNOSIS — I63.9 CEREBROVASCULAR ACCIDENT (CVA), UNSPECIFIED MECHANISM (H): ICD-10-CM

## 2017-06-28 DIAGNOSIS — E78.5 HYPERLIPIDEMIA WITH TARGET LDL LESS THAN 100: ICD-10-CM

## 2017-06-28 DIAGNOSIS — E11.40 TYPE 2 DIABETES MELLITUS WITH DIABETIC NEUROPATHY, WITHOUT LONG-TERM CURRENT USE OF INSULIN (H): Primary | ICD-10-CM

## 2017-06-28 PROCEDURE — 99214 OFFICE O/P EST MOD 30 MIN: CPT | Performed by: INTERNAL MEDICINE

## 2017-06-28 RX ORDER — GLIMEPIRIDE 2 MG/1
2 TABLET ORAL
Qty: 90 TABLET | Refills: 1 | Status: SHIPPED | OUTPATIENT
Start: 2017-06-28 | End: 2017-12-09

## 2017-06-28 RX ORDER — METFORMIN HCL 500 MG
500 TABLET, EXTENDED RELEASE 24 HR ORAL
Qty: 90 TABLET | Refills: 1 | Status: SHIPPED | OUTPATIENT
Start: 2017-06-28 | End: 2018-03-26

## 2017-06-28 RX ORDER — LOSARTAN POTASSIUM 50 MG/1
50 TABLET ORAL DAILY
Qty: 90 TABLET | Refills: 3 | Status: SHIPPED | OUTPATIENT
Start: 2017-06-28 | End: 2018-04-17

## 2017-06-28 NOTE — PROGRESS NOTES
SUBJECTIVE:                                                    Kelly Polk is a 46 year old female who presents to clinic today for the following health issues:    Diabetes Follow-up    Patient is checking blood sugars: once daily.  Results are as follows:         am -     Diabetic concerns: None     Symptoms of hypoglycemia (low blood sugar): none     Paresthesias (numbness or burning in feet) or sores: h/o diabetic neuropathy     Lab Results   Component Value Date    A1C 5.7 06/29/2017    A1C 6.2 03/27/2017     Lab Results   Component Value Date    MICROL 800 06/29/2017    MICROL 2440 03/27/2017     No results found for: MICROALBUMIN       Hyperlipidemia Follow-Up      Rate your low fat/cholesterol diet?: good    Taking statin?  Yes, no muscle aches from statin    Other lipid medications/supplements?:  None  Lab Results   Component Value Date     06/29/2017    LDL 93 08/12/2016           Hypertension Follow-up      Outpatient blood pressures are being checked at home     Low Salt Diet: no added salt    Cerebrovascular Follow-up      Patient history: CVA with left hemiplegia    Residual symptoms: left arm and leg weakness    Worsened or new symptoms since last visit: No    Daily aspirin use: No, on plavix    Hypertension controlled: Yes      Amount of exercise or physical activity: None    Problems taking medications regularly: No    Medication side effects: none    Diet: low salt and low fat/cholesterol      Patient Active Problem List   Diagnosis     Hyperlipidemia with target LDL less than 100     Left hemiplegia (H)     Fibroid uterus     HPV in female     Diabetic nephropathy with proteinuria (H)     Cerebrovascular accident (CVA), unspecified mechanism (H)     Essential hypertension with goal blood pressure less than 140/90     Lumbago     Type 2 diabetes mellitus with diabetic neuropathy, without long-term current use of insulin (H)     No past surgical history on file.    Social History    Substance Use Topics     Smoking status: Never Smoker     Smokeless tobacco: Never Used     Alcohol use No     Family History   Problem Relation Age of Onset     Breast Cancer Mother      Hypertension Brother          Current Outpatient Prescriptions   Medication Sig Dispense Refill     losartan (COZAAR) 50 MG tablet Take 1 tablet (50 mg) by mouth daily 90 tablet 3     metFORMIN (GLUCOPHAGE-XR) 500 MG 24 hr tablet Take 1 tablet (500 mg) by mouth daily (with dinner) 90 tablet 1     sitagliptin (JANUVIA) 50 MG tablet Take 1 tablet (50 mg) by mouth daily (with breakfast) 90 tablet 1     glimepiride (AMARYL) 2 MG tablet Take 1 tablet (2 mg) by mouth every morning (before breakfast) 90 tablet 1     order for DME Equipment being ordered: knee high compression stockings, 30-40mmHg 2 Units 0     blood glucose monitoring (NO BRAND SPECIFIED) test strip Use to test blood sugars 2 times daily or as directed 180 each 0     amLODIPine (NORVASC) 10 MG tablet Take 1 tablet (10 mg) by mouth daily 90 tablet 3     clopidogrel (PLAVIX) 75 MG tablet Take 1 tablet (75 mg) by mouth daily 90 tablet 3             order for DME Equipment being ordered: left AFO brace 1 Units 0     ASPIRIN NOT PRESCRIBED, INTENTIONAL, 1 each daily Antiplatelet medication not prescribed intentionally due to on Plavix 0 each 0     order for DME Accu-check glucometer test strips or per insurance coverage    Check QAM and  Units 3     order for DME Accu-check glucometer or per insurance coverage 1 Units 0     order for DME Accu-check glucometer test lancets, or per insurance coverage    Check QAM and  Units 3     lipitor 10mg          ROS: The following systems have been completely reviewed and are negative except as noted in the HPI: CONSTITUTIONAL,  CARDIOVASCULAR, PULMONARY    OBJECTIVE:                                                    /74 (Cuff Size: Adult Regular)  Pulse 96  Temp 98.4  F (36.9  C) (Oral)  Wt 201 lb (91.2 kg)   SpO2 100%  BMI 35.61 kg/m2 Body mass index is 35.61 kg/(m^2).  GENERAL:  alert,  no distress  NECK: no tenderness, no adenopathy  RESP: lungs clear to auscultation - no rales, no rhonchi, no wheezes  CV: regular rates and rhythm, normal S1 S2  Neuro: left hemiplegia  MS: extremities-  Trace bilateral lower extremity edema     ASSESSMENT/PLAN:                                                        ICD-10-CM    1. Type 2 diabetes mellitus with diabetic neuropathy, without long-term current use of insulin (H) E11.40 metFORMIN (GLUCOPHAGE-XR) 500 MG 24 hr tablet     sitagliptin (JANUVIA) 50 MG tablet     Adequate control.  Continue current regimen without changes.      2. Type 2 diabetes mellitus with diabetic nephropathy, without long-term current use of insulin (H) E11.21 NEPHROLOGY ADULT REFERRAL     Hemoglobin A1c     Albumin Random Urine Quantitative      Significant proteinuria consistent with diabetic nephropathy.   Recommended patient establish care with primary nephrologist     3. Cerebrovascular accident (CVA), unspecified mechanism (H) I63.9  history of CVA and left hemiplegia.  Continue Plavix, plan to optimize lipids,  Blood pressure adequately controlled          4. Essential hypertension with goal blood pressure less than 140/90 I10 losartan (COZAAR) 50 MG tablet       Adequate control.  Continue current regimen without changes.      5. Hyperlipidemia with target LDL less than 100 E78.5 Lipid panel reflex to direct LDL     Comprehensive metabolic panel       LDL above goal, plan to increase atorvastatin to 20 mg repeat fasting lab work 2 months     6. Edema, unspecified type R60.9 order for DME      Trace lower extremity edema likely secondary to calcium channel blocker, recommended support stockings when out of bed      Willis Chen MD  [facility]

## 2017-06-28 NOTE — MR AVS SNAPSHOT
After Visit Summary   6/28/2017    Kelly Polk    MRN: [id number]           Patient Information     Date Of Birth          1970        Visit Information        Provider Department      6/28/2017 9:40 AM Willis Chen MD [facility]        Today's Diagnoses     Type 2 diabetes mellitus with diabetic neuropathy, without long-term current use of insulin (H)    -  1    Type 2 diabetes mellitus with diabetic nephropathy, without long-term current use of insulin (H)        Essential hypertension with goal blood pressure less than 140/90        Cerebrovascular accident (CVA), unspecified mechanism (H)        Hyperlipidemia with target LDL less than 100          Care Instructions    INSTRUCTIONS FOR TODAY:     schedule visit with Nephrology (Dr Levine or Moy)   fasting labwork   follow-up visit in 6 months     Dr Chen            Follow-ups after your visit        Additional Services     NEPHROLOGY ADULT REFERRAL       Your provider has referred you to: TERE: Molly Carcamo [phone]   [email or web address]    Please be aware that coverage of these services is subject to the terms and limitations of your health insurance plan.  Call member services at your health plan with any benefit or coverage questions.      Reason for referral:  Proteinuria > 1 gram/24 hours.   Please bring the following to your appointment:    >>   Any x-rays, CTs or MRIs which have been performed.  Contact the facility where they were done to arrange for  prior to your scheduled appointment.   >>   List of current medications   >>   This referral request   >>   Any documents/labs given to you for this referral                  Future tests that were ordered for you today     Open Future Orders        Priority Expected Expires Ordered    Lipid panel reflex to direct LDL Routine  7/28/2017 6/28/2017    Hemoglobin A1c Routine  7/28/2017 6/28/2017    Comprehensive  "metabolic panel Routine  2017    Albumin Random Urine Quantitative Routine  2017            Who to contact     If you have questions or need follow up information about today's clinic visit or your schedule please contact [facility] [place] directly at [phone].  Normal or non-critical lab and imaging results will be communicated to you by MyChart, letter or phone within 4 business days after the clinic has received the results. If you do not hear from us within 7 days, please contact the clinic through MyChart or phone. If you have a critical or abnormal lab result, we will notify you by phone as soon as possible.  Submit refill requests through [employer] or call your pharmacy and they will forward the refill request to us. Please allow 3 business days for your refill to be completed.          Additional Information About Your Visit        MyChart Information     [employer] lets you send messages to your doctor, view your test results, renew your prescriptions, schedule appointments and more. To sign up, go to [email or web address] . Click on \"Log in\" on the left side of the screen, which will take you to the Welcome page. Then click on \"Sign up Now\" on the right side of the page.     You will be asked to enter the access code listed below, as well as some personal information. Please follow the directions to create your username and password.     Your access code is: [email or web address]  Expires: 2017 10:18 AM     Your access code will  in 90 days. If you need help or a new code, please call your [place] clinic or [phone].        Care EveryWhere ID     This is your Care EveryWhere ID. This could be used by other organizations to access your [place] medical records  [id number]        Your Vitals Were     Pulse Temperature Pulse Oximetry BMI (Body Mass Index)          96 98.4  F (36.9  C) (Oral) 100% 35.61 kg/m2         Blood Pressure from Last 3 Encounters:   17 " 118/74   03/27/17 124/80   09/28/16 126/68    Weight from Last 3 Encounters:   06/28/17 201 lb (91.2 kg)   03/27/17 199 lb (90.3 kg)   09/28/16 199 lb 11.2 oz (90.6 kg)              We Performed the Following     NEPHROLOGY ADULT REFERRAL          Today's Medication Changes          These changes are accurate as of: 6/28/17 10:18 AM.  If you have any questions, ask your nurse or doctor.               Stop taking these medicines if you haven't already. Please contact your care team if you have questions.     ferrous sulfate 325 (65 FE) MG tablet   Commonly known as:  IRON   Stopped by:  Willis Chen MD           HumuLIN N  UNIT/ML injection   Generic drug:  insulin isophane human   Stopped by:  Willis Chen MD           methyldopa 250 MG tablet   Commonly known as:  ALDOMET   Stopped by:  Willis Chen MD                Where to get your medicines      These medications were sent to [facility] - [address], [facility] [address]     Phone:  [phone]     glimepiride 2 MG tablet    losartan 50 MG tablet    metFORMIN 500 MG 24 hr tablet    sitagliptin 50 MG tablet                Primary Care Provider Office Phone # Fax #    Willis Chen [phone]       [address] [place] [address]        Equal Access to Services     [facility] AH: Hadii aad ku hadasho Soomaali, waaxda luqadaha, qaybta kaalmada adeegyada, eve jewell . So [facility] [phone].    ATENCIÓN: Si habla español, tiene a rader disposición servicios gratuitos de asistencia lingüística. James husain [phone].    We comply with applicable federal civil rights laws and [place] laws. We do not discriminate on the basis of race, color, national origin, age, disability sex, sexual orientation or gender identity.            Thank you!     Thank you for choosing [facility]  [place]  for your care. Our goal is always to provide you with excellent care. Hearing back from our patients is one way we can continue to improve our services. Please take a few minutes to complete the written survey that you may receive in the mail after your visit with us. Thank you!             Your Updated Medication List - Protect others around you: Learn how to safely use, store and throw away your medicines at www.disposemymeds.org.          This list is accurate as of: 6/28/17 10:18 AM.  Always use your most recent med list.                   Brand Name Dispense Instructions for use Diagnosis    amLODIPine 10 MG tablet    NORVASC    90 tablet    Take 1 tablet (10 mg) by mouth daily        ASPIRIN NOT PRESCRIBED    INTENTIONAL    0 each    1 each daily Antiplatelet medication not prescribed intentionally due to on Plavix    Type 2 diabetes mellitus with hyperglycemia (H)       atorvastatin 10 MG tablet    LIPITOR    90 tablet    Take 1 tablet (10 mg) by mouth daily    Hyperlipidemia with target LDL less than 100       blood glucose monitoring test strip    no brand specified    180 each    Use to test blood sugars 2 times daily or as directed    Type 2 diabetes mellitus with diabetic neuropathy, without long-term current use of insulin (H)       clopidogrel 75 MG tablet    PLAVIX    90 tablet    Take 1 tablet (75 mg) by mouth daily    Cerebrovascular accident (CVA), unspecified mechanism (H)       glimepiride 2 MG tablet    AMARYL    90 tablet    Take 1 tablet (2 mg) by mouth every morning (before breakfast)    Cerebrovascular accident (CVA), unspecified mechanism (H)       losartan 50 MG tablet    COZAAR    90 tablet    Take 1 tablet (50 mg) by mouth daily    Essential hypertension with goal blood pressure less than 140/90       metFORMIN 500 MG 24 hr tablet    GLUCOPHAGE-XR    90 tablet    Take 1 tablet (500 mg) by mouth daily (with dinner)    Type 2 diabetes mellitus with diabetic neuropathy, without  long-term current use of insulin (H)       * order for DME     1 Units    Accu-check glucometer or per insurance coverage    Type 2 diabetes mellitus with diabetic neuropathy (H)       * order for DME     180 Units    Accu-check glucometer test lancets, or per insurance coverage  Check QAM and QPM    Type 2 diabetes mellitus with diabetic neuropathy (H)       * order for DME     180 Units    Accu-check glucometer test strips or per insurance coverage  Check QAM and QPM    Type 2 diabetes mellitus with diabetic neuropathy (H)       * order for DME     1 Units    Equipment being ordered: left AFO brace    Left foot drop       sitagliptin 50 MG tablet    JANUVIA    90 tablet    Take 1 tablet (50 mg) by mouth daily (with breakfast)    Type 2 diabetes mellitus with diabetic neuropathy, without long-term current use of insulin (H)       * Notice:  This list has 4 medication(s) that are the same as other medications prescribed for you. Read the directions carefully, and ask your doctor or other care provider to review them with you.

## 2017-06-28 NOTE — NURSING NOTE
"Chief Complaint   Patient presents with     Diabetes       Initial /74 (Cuff Size: Adult Regular)  Pulse 96  Temp 98.4  F (36.9  C) (Oral)  Wt 201 lb (91.2 kg)  SpO2 100%  BMI 35.61 kg/m2 Estimated body mass index is 35.61 kg/(m^2) as calculated from the following:    Height as of 3/27/17: 5' 3\" (1.6 m).    Weight as of this encounter: 201 lb (91.2 kg).  Medication Reconciliation: kota Harrison CMA    "

## 2017-06-28 NOTE — PATIENT INSTRUCTIONS
INSTRUCTIONS FOR TODAY:     schedule visit with Nephrology (Dr Levine or Moy)   fasting labwork   follow-up visit in 6 months     Dr Chen

## 2017-06-29 DIAGNOSIS — M21.372 LEFT FOOT DROP: ICD-10-CM

## 2017-06-29 DIAGNOSIS — E11.42 DIABETIC POLYNEUROPATHY ASSOCIATED WITH TYPE 2 DIABETES MELLITUS (H): Primary | ICD-10-CM

## 2017-06-29 DIAGNOSIS — E11.21 TYPE 2 DIABETES MELLITUS WITH DIABETIC NEPHROPATHY, WITHOUT LONG-TERM CURRENT USE OF INSULIN (H): ICD-10-CM

## 2017-06-29 DIAGNOSIS — E78.5 HYPERLIPIDEMIA WITH TARGET LDL LESS THAN 100: ICD-10-CM

## 2017-06-29 LAB
ALBUMIN SERPL-MCNC: 3.4 G/DL (ref 3.4–5)
ALP SERPL-CCNC: 58 U/L (ref 40–150)
ALT SERPL W P-5'-P-CCNC: 17 U/L (ref 0–50)
ANION GAP SERPL CALCULATED.3IONS-SCNC: 8 MMOL/L (ref 3–14)
AST SERPL W P-5'-P-CCNC: 18 U/L (ref 0–45)
BILIRUB SERPL-MCNC: 0.4 MG/DL (ref 0.2–1.3)
BUN SERPL-MCNC: 23 MG/DL (ref 7–30)
CALCIUM SERPL-MCNC: 9 MG/DL (ref 8.5–10.1)
CHLORIDE SERPL-SCNC: 108 MMOL/L (ref 94–109)
CHOLEST SERPL-MCNC: 210 MG/DL
CO2 SERPL-SCNC: 23 MMOL/L (ref 20–32)
CREAT SERPL-MCNC: 1.33 MG/DL (ref 0.52–1.04)
CREAT UR-MCNC: 97 MG/DL
GFR SERPL CREATININE-BSD FRML MDRD: 43 ML/MIN/1.7M2
GLUCOSE SERPL-MCNC: 97 MG/DL (ref 70–99)
HBA1C MFR BLD: 5.7 % (ref 4.3–6)
HDLC SERPL-MCNC: 53 MG/DL
LDLC SERPL CALC-MCNC: 124 MG/DL
MICROALBUMIN UR-MCNC: 800 MG/L
MICROALBUMIN/CREAT UR: 828.16 MG/G CR (ref 0–25)
NONHDLC SERPL-MCNC: 157 MG/DL
POTASSIUM SERPL-SCNC: 4.1 MMOL/L (ref 3.4–5.3)
PROT SERPL-MCNC: 7.7 G/DL (ref 6.8–8.8)
SODIUM SERPL-SCNC: 139 MMOL/L (ref 133–144)
TRIGL SERPL-MCNC: 164 MG/DL

## 2017-06-29 PROCEDURE — 80061 LIPID PANEL: CPT | Performed by: INTERNAL MEDICINE

## 2017-06-29 PROCEDURE — 80053 COMPREHEN METABOLIC PANEL: CPT | Performed by: INTERNAL MEDICINE

## 2017-06-29 PROCEDURE — 36415 COLL VENOUS BLD VENIPUNCTURE: CPT | Performed by: INTERNAL MEDICINE

## 2017-06-29 PROCEDURE — 82043 UR ALBUMIN QUANTITATIVE: CPT | Performed by: INTERNAL MEDICINE

## 2017-06-29 PROCEDURE — 83036 HEMOGLOBIN GLYCOSYLATED A1C: CPT | Performed by: INTERNAL MEDICINE

## 2017-06-29 NOTE — Clinical Note
Hi Dr. Chen,  Please sign order for AFO (she never got one last year) and diabetic shoes and inserts. Thank you!

## 2017-07-02 ENCOUNTER — TELEPHONE (OUTPATIENT)
Dept: PEDIATRICS | Facility: CLINIC | Age: 47
End: 2017-07-02

## 2017-07-02 DIAGNOSIS — E78.5 HYPERLIPIDEMIA WITH TARGET LDL LESS THAN 100: Primary | ICD-10-CM

## 2017-07-02 RX ORDER — ATORVASTATIN CALCIUM 20 MG/1
20 TABLET, FILM COATED ORAL DAILY
Qty: 90 TABLET | Refills: 3 | Status: SHIPPED | OUTPATIENT
Start: 2017-07-02 | End: 2018-04-17

## 2017-07-03 NOTE — TELEPHONE ENCOUNTER
Please call pt.  Cholesterol is above goal, would recommend increasing her Lipitor dose from 10mg to 20mg.  I have sent the new script. Please have her return for repeat fasting labwork after 2 months on the new dose.    Lab Results   Component Value Date     06/29/2017    LDL 93 08/12/2016

## 2017-07-03 NOTE — TELEPHONE ENCOUNTER
Patient notified .  Voices understanding of instructions.  No further questions.  Will call back to schedule an appointment.  Will call back if any other questions or concerns.  LATISHA Cruz RN

## 2017-07-03 NOTE — TELEPHONE ENCOUNTER
Left message for patient to call clinic regarding info from .  Christy, [facility]

## 2017-07-07 DIAGNOSIS — I10 ESSENTIAL HYPERTENSION WITH GOAL BLOOD PRESSURE LESS THAN 140/90: ICD-10-CM

## 2017-07-07 NOTE — TELEPHONE ENCOUNTER
losartan (COZAAR) 50 MG tablet      Last Written Prescription Date: 6/28/17  Last Fill Quantity: 90, # refills: 3  Last Office Visit with G, P or [facility] prescribing provider: 6/28/17       Potassium   Date Value Ref Range Status   06/29/2017 4.1 3.4 - 5.3 mmol/L Final     Creatinine   Date Value Ref Range Status   06/29/2017 1.33 (H) 0.52 - 1.04 mg/dL Final     BP Readings from Last 3 Encounters:   06/28/17 118/74   03/27/17 124/80   09/28/16 126/68

## 2017-07-10 RX ORDER — LOSARTAN POTASSIUM 50 MG/1
TABLET ORAL
Qty: 30 TABLET | Refills: 1 | OUTPATIENT
Start: 2017-07-10

## 2017-07-10 NOTE — TELEPHONE ENCOUNTER
One year supply sent to mail order 6/28/17. Pharmacy notified.    Arielle Vallejo MSN, RN-BC  Care Coordinator  [facility] [place]

## 2017-09-05 ENCOUNTER — TELEPHONE (OUTPATIENT)
Dept: PEDIATRICS | Facility: CLINIC | Age: 47
End: 2017-09-05

## 2017-09-05 DIAGNOSIS — G81.94 LEFT HEMIPLEGIA (H): Primary | ICD-10-CM

## 2017-09-05 NOTE — TELEPHONE ENCOUNTER
Patient calling that she would like a rx for a walker with a basket and a seat as she is going back to school and needs this so she doesn't have to carry the books with a cane. [facility].  Order pended for signature if appropriate.  [phone] ok to shannen.   Bailey Fitzgerald RN

## 2017-09-28 ENCOUNTER — TELEPHONE (OUTPATIENT)
Dept: PEDIATRICS | Facility: CLINIC | Age: 47
End: 2017-09-28

## 2017-09-28 NOTE — LETTER
Attn:     Annmarie Lebron  fax [phone]   [facility]      To whom it may concern,    Kelly is under my care for medical conditions.  Patient has history of CVA with left -sided hemiplegia.      Sincerely,         Willis Chen MD

## 2017-09-28 NOTE — TELEPHONE ENCOUNTER
Patient is going to school at [facility].  Her school is requesting a letter from her provider documenting that she has disabilities.  Needs diagnosis included in this letter, and needs Doctor's signature, and must be on [place] letterhead.    Please fax to [phone],  Attbecca Lberon  [facility]  Patient has appointment at [place] On Monday am at 1100    Letter pended, please review and sign if in agreement.  LATISHA Cruz RN

## 2017-10-04 ENCOUNTER — TELEPHONE (OUTPATIENT)
Dept: PEDIATRICS | Facility: CLINIC | Age: 47
End: 2017-10-04

## 2017-10-04 NOTE — TELEPHONE ENCOUNTER
Panel Management Review      Patient has the following on her problem list:     Diabetes    ASA: Not Required     Last A1C  Lab Results   Component Value Date    A1C 5.7 06/29/2017    A1C 6.2 03/27/2017    A1C 6.2 09/28/2016    A1C 6.9 09/15/2016    A1C 6.2 05/17/2016     A1C tested: MONITOR    Last LDL:    Lab Results   Component Value Date    CHOL 210 06/29/2017     Lab Results   Component Value Date    HDL 53 06/29/2017     Lab Results   Component Value Date     06/29/2017     Lab Results   Component Value Date    TRIG 164 06/29/2017     Lab Results   Component Value Date    CHOLHDLRATIO 3.3 08/12/2016     Lab Results   Component Value Date    NHDL 157 06/29/2017       Is the patient on a Statin? YES             Is the patient on Aspirin? NO    Medications     HMG CoA Reductase Inhibitors    atorvastatin (LIPITOR) 20 MG tablet          Last three blood pressure readings:  BP Readings from Last 3 Encounters:   06/28/17 118/74   03/27/17 124/80   09/28/16 126/68       Date of last diabetes office visit: 6/28/17     Tobacco History:     History   Smoking Status     Never Smoker   Smokeless Tobacco     Never Used         Hypertension   Last three blood pressure readings:  BP Readings from Last 3 Encounters:   06/28/17 118/74   03/27/17 124/80   09/28/16 126/68     Blood pressure: Passed    HTN Guidelines:  Age 18-59 BP range:  Less than 140/90  Age 60-85 with Diabetes:  Less than 140/90  Age 60-85 without Diabetes:  less than 150/90          Composite cancer screening  Chart review shows that this patient is due/due soon for the following Pap Smear  Summary:    Patient is due/failing the following:   PAP and PHYSICAL    Action needed:   Patient needs office visit for physical.    Type of outreach:    Phone, spoke to patient.  Pt verbalized understanding, appt made for 10/16 with Dr. Hawk    Questions for provider review:    None                                                                                                                                     Monique Sifuentes MA 10/4/2017 9:31 AM       Chart routed to Provider Close Encounter .

## 2017-10-16 ENCOUNTER — OFFICE VISIT (OUTPATIENT)
Dept: PEDIATRICS | Facility: CLINIC | Age: 47
End: 2017-10-16
Payer: COMMERCIAL

## 2017-10-16 VITALS
TEMPERATURE: 97.8 F | HEART RATE: 89 BPM | DIASTOLIC BLOOD PRESSURE: 80 MMHG | HEIGHT: 63 IN | RESPIRATION RATE: 15 BRPM | BODY MASS INDEX: 35.8 KG/M2 | SYSTOLIC BLOOD PRESSURE: 136 MMHG | OXYGEN SATURATION: 98 % | WEIGHT: 202.06 LBS

## 2017-10-16 DIAGNOSIS — G81.94 LEFT HEMIPLEGIA (H): ICD-10-CM

## 2017-10-16 DIAGNOSIS — Z12.31 ENCOUNTER FOR SCREENING MAMMOGRAM FOR BREAST CANCER: ICD-10-CM

## 2017-10-16 DIAGNOSIS — E11.40 TYPE 2 DIABETES MELLITUS WITH DIABETIC NEUROPATHY, WITHOUT LONG-TERM CURRENT USE OF INSULIN (H): ICD-10-CM

## 2017-10-16 DIAGNOSIS — I10 ESSENTIAL HYPERTENSION WITH GOAL BLOOD PRESSURE LESS THAN 140/90: ICD-10-CM

## 2017-10-16 DIAGNOSIS — Z01.419 WELL WOMAN EXAM WITH ROUTINE GYNECOLOGICAL EXAM: Primary | ICD-10-CM

## 2017-10-16 DIAGNOSIS — Z86.73 HISTORY OF CVA (CEREBROVASCULAR ACCIDENT): ICD-10-CM

## 2017-10-16 PROCEDURE — 87624 HPV HI-RISK TYP POOLED RSLT: CPT | Performed by: INTERNAL MEDICINE

## 2017-10-16 PROCEDURE — G0145 SCR C/V CYTO,THINLAYER,RESCR: HCPCS | Performed by: INTERNAL MEDICINE

## 2017-10-16 PROCEDURE — 99207 C FOOT EXAM  NO CHARGE: CPT | Mod: 25 | Performed by: INTERNAL MEDICINE

## 2017-10-16 PROCEDURE — 99396 PREV VISIT EST AGE 40-64: CPT | Performed by: INTERNAL MEDICINE

## 2017-10-16 NOTE — LETTER
October 25, 2017    Kelly Polk  [address]  [place] [address]    Dear Kelly,  We are happy to inform you that your PAP smear result from 10/16/17 is normal.  We are now able to do a follow up test on PAP smears. The DNA test is for HPV (Human Papilloma Virus). Cervical cancer is closely linked with certain types of HPV. Your result showed no evidence of high risk HPV.  Therefore we recommend you return in 3 years for your next pap smear and HPV test.  You will still need to return to the clinic every year for an annual exam and other preventive tests.  Please contact the clinic at [phone] with any questions.  Sincerely,    Ivory Velasquez MD/nevaeh

## 2017-10-16 NOTE — PATIENT INSTRUCTIONS
Schedule mammogram on your way out!    Preventive Health Recommendations  Female Ages 40 to 49    Yearly exam:     See your health care provider every year in order to  1. Review health changes.   2. Discuss preventive care.    3. Review your medicines if your doctor prescribed any.      Get a Pap test every three years (unless you have an abnormal result and your provider advises testing more often).      If you get Pap tests with HPV test, you only need to test every 5 years, unless you have an abnormal result. You do not need a Pap test if your uterus was removed (hysterectomy) and you have not had cancer.      You should be tested each year for STDs (sexually transmitted diseases), if you're at risk.       Ask your doctor if you should have a mammogram.      Have a colonoscopy (test for colon cancer) if someone in your family has had colon cancer or polyps before age 50.       Have a cholesterol test every 5 years.       Have a diabetes test (fasting glucose) after age 45. If you are at risk for diabetes, you should have this test every 3 years.    Shots: Get a flu shot each year. Get a tetanus shot every 10 years.     Nutrition:     Eat at least 5 servings of fruits and vegetables each day.    Eat whole-grain bread, whole-wheat pasta and brown rice instead of white grains and rice.    Talk to your provider about Calcium and Vitamin D.     Lifestyle    Exercise at least 150 minutes a week (an average of 30 minutes a day, 5 days a week). This will help you control your weight and prevent disease.    Limit alcohol to one drink per day.    No smoking.     Wear sunscreen to prevent skin cancer.    See your dentist every six months for an exam and cleaning.

## 2017-10-16 NOTE — NURSING NOTE
"Chief Complaint   Patient presents with     Physical       Initial /80 (BP Location: Right arm, Patient Position: Sitting, Cuff Size: Adult Large)  Pulse 89  Temp 97.8  F (36.6  C) (Oral)  Resp 15  Ht 5' 3\" (1.6 m)  Wt 202 lb 1 oz (91.7 kg)  LMP 09/17/2017  SpO2 98%  BMI 35.79 kg/m2 Estimated body mass index is 35.79 kg/(m^2) as calculated from the following:    Height as of this encounter: 5' 3\" (1.6 m).    Weight as of this encounter: 202 lb 1 oz (91.7 kg).  Medication Reconciliation: complete     Queta Flanagan MA    "

## 2017-10-16 NOTE — MR AVS SNAPSHOT
After Visit Summary   10/16/2017    Kelly Polk    MRN: [id number]           Patient Information     Date Of Birth          1970        Visit Information        Provider Department      10/16/2017 10:50 AM Ivory Hawk MD [facility]        Today's Diagnoses     Well woman exam with routine gynecological exam    -  1    Type 2 diabetes mellitus with diabetic neuropathy, without long-term current use of insulin (H)        Encounter for screening mammogram for breast cancer          Care Instructions    Schedule mammogram on your way out!    Preventive Health Recommendations  Female Ages 40 to 49    Yearly exam:     See your health care provider every year in order to  1. Review health changes.   2. Discuss preventive care.    3. Review your medicines if your doctor prescribed any.      Get a Pap test every three years (unless you have an abnormal result and your provider advises testing more often).      If you get Pap tests with HPV test, you only need to test every 5 years, unless you have an abnormal result. You do not need a Pap test if your uterus was removed (hysterectomy) and you have not had cancer.      You should be tested each year for STDs (sexually transmitted diseases), if you're at risk.       Ask your doctor if you should have a mammogram.      Have a colonoscopy (test for colon cancer) if someone in your family has had colon cancer or polyps before age 50.       Have a cholesterol test every 5 years.       Have a diabetes test (fasting glucose) after age 45. If you are at risk for diabetes, you should have this test every 3 years.    Shots: Get a flu shot each year. Get a tetanus shot every 10 years.     Nutrition:     Eat at least 5 servings of fruits and vegetables each day.    Eat whole-grain bread, whole-wheat pasta and brown rice instead of white grains and rice.    Talk to your provider about Calcium and Vitamin D.     Lifestyle    Exercise at least 150  "minutes a week (an average of 30 minutes a day, 5 days a week). This will help you control your weight and prevent disease.    Limit alcohol to one drink per day.    No smoking.     Wear sunscreen to prevent skin cancer.    See your dentist every six months for an exam and cleaning.          Follow-ups after your visit        Future tests that were ordered for you today     Open Future Orders        Priority Expected Expires Ordered    *MA Screening Digital Bilateral Routine  10/16/2018 10/16/2017            Who to contact     If you have questions or need follow up information about today's clinic visit or your schedule please contact [facility] directly at [phone].  Normal or non-critical lab and imaging results will be communicated to you by MyChart, letter or phone within 4 business days after the clinic has received the results. If you do not hear from us within 7 days, please contact the clinic through [employer] or phone. If you have a critical or abnormal lab result, we will notify you by phone as soon as possible.  Submit refill requests through [employer] or call your pharmacy and they will forward the refill request to us. Please allow 3 business days for your refill to be completed.          Additional Information About Your Visit        [employer] Information     [employer] lets you send messages to your doctor, view your test results, renew your prescriptions, schedule appointments and more. To sign up, go to [email or web address] . Click on \"Log in\" on the left side of the screen, which will take you to the Welcome page. Then click on \"Sign up Now\" on the right side of the page.     You will be asked to enter the access code listed below, as well as some personal information. Please follow the directions to create your username and password.     Your access code is: [email or web address]  Expires: 2018 11:46 AM     Your access code will  in 90 days. If you need help or a new code, please call " "your [facility] clinic or [phone].        Care EveryWhere ID     This is your Care EveryWhere ID. This could be used by other organizations to access your [facility] medical records  [id number]        Your Vitals Were     Pulse Temperature Respirations Height Last Period Pulse Oximetry    89 97.8  F (36.6  C) (Oral) 15 5' 3\" (1.6 m) 09/22/2017 98%    BMI (Body Mass Index)                   35.79 kg/m2            Blood Pressure from Last 3 Encounters:   10/16/17 136/80   06/28/17 118/74   03/27/17 124/80    Weight from Last 3 Encounters:   10/16/17 202 lb 1 oz (91.7 kg)   06/28/17 201 lb (91.2 kg)   03/27/17 199 lb (90.3 kg)              We Performed the Following     FOOT EXAM     HPV High Risk Types DNA Cervical     Pap imaged thin layer screen with HPV - recommended age 30 - 65        Primary Care Provider    None Specified       No primary provider on file.        Equal Access to Services     TOI BRUMFIELD : Hadii nick Gregory, wawallyda safia, qaybta kaalmashaquille sotelo, eve jewell . So [facility] [phone].    ATENCIÓN: Si habla montserrat, tiene a rader disposición servicios gratuitos de asistencia lingüística. Llame al [phone].    We comply with applicable federal civil rights laws and [place] laws. We do not discriminate on the basis of race, color, national origin, age, disability, sex, sexual orientation, or gender identity.            Thank you!     Thank you for choosing [facility]  for your care. Our goal is always to provide you with excellent care. Hearing back from our patients is one way we can continue to improve our services. Please take a few minutes to complete the written survey that you may receive in the mail after your visit with us. Thank you!             Your Updated Medication List - Protect others around you: Learn how to safely use, store and throw away your medicines at www.disposemymeds.org.          This list is accurate as of: " 10/16/17 11:46 AM.  Always use your most recent med list.                   Brand Name Dispense Instructions for use Diagnosis    amLODIPine 10 MG tablet    NORVASC    90 tablet    Take 1 tablet (10 mg) by mouth daily        ASPIRIN NOT PRESCRIBED    INTENTIONAL    0 each    1 each daily Antiplatelet medication not prescribed intentionally due to on Plavix    Type 2 diabetes mellitus with hyperglycemia (H)       atorvastatin 20 MG tablet    LIPITOR    90 tablet    Take 1 tablet (20 mg) by mouth daily    Hyperlipidemia with target LDL less than 100       blood glucose monitoring test strip    no brand specified    180 each    Use to test blood sugars 2 times daily or as directed    Type 2 diabetes mellitus with diabetic neuropathy, without long-term current use of insulin (H)       clopidogrel 75 MG tablet    PLAVIX    90 tablet    Take 1 tablet (75 mg) by mouth daily    Cerebrovascular accident (CVA), unspecified mechanism (H)       glimepiride 2 MG tablet    AMARYL    90 tablet    Take 1 tablet (2 mg) by mouth every morning (before breakfast)    Cerebrovascular accident (CVA), unspecified mechanism (H)       losartan 50 MG tablet    COZAAR    90 tablet    Take 1 tablet (50 mg) by mouth daily    Essential hypertension with goal blood pressure less than 140/90       metFORMIN 500 MG 24 hr tablet    GLUCOPHAGE-XR    90 tablet    Take 1 tablet (500 mg) by mouth daily (with dinner)    Type 2 diabetes mellitus with diabetic neuropathy, without long-term current use of insulin (H)       * order for DME     1 Units    Accu-check glucometer or per insurance coverage    Type 2 diabetes mellitus with diabetic neuropathy (H)       * order for DME     180 Units    Accu-check glucometer test lancets, or per insurance coverage  Check QAM and QPM    Type 2 diabetes mellitus with diabetic neuropathy (H)       * order for DME     180 Units    Accu-check glucometer test strips or per insurance coverage  Check QAM and QPM    Type 2  diabetes mellitus with diabetic neuropathy (H)       * order for DME     1 Units    Equipment being ordered: left AFO brace    Left foot drop       order for DME     2 Units    Equipment being ordered: knee high compression stockings, 30-40mmHg    Edema, unspecified type       * order for DME     1 Device    Seated walker with basket    Left hemiplegia (H)       sitagliptin 50 MG tablet    JANUVIA    90 tablet    Take 1 tablet (50 mg) by mouth daily (with breakfast)    Type 2 diabetes mellitus with diabetic neuropathy, without long-term current use of insulin (H)       * Notice:  This list has 5 medication(s) that are the same as other medications prescribed for you. Read the directions carefully, and ask your doctor or other care provider to review them with you.

## 2017-10-16 NOTE — PROGRESS NOTES
SUBJECTIVE:   CC: Kelly Polk is an 47 year old woman who presents for preventive health visit.     Physical   Annual:     Getting at least 3 servings of Calcium per day::  Yes    Bi-annual eye exam::  Yes    Dental care twice a year::  Yes    Sleep apnea or symptoms of sleep apnea::  None    Diet::  Low fat/cholesterol and Diabetic    Frequency of exercise::  2-3 days/week    Duration of exercise::  30-45 minutes    Taking medications regularly::  Yes    Medication side effects::  None    Additional concerns today::  No      Today's PHQ-2 Score: PHQ-2 ( 1999 [employer]) 10/16/2017   Q1: Little interest or pleasure in doing things 0   Q2: Feeling down, depressed or hopeless 0   PHQ-2 Score 0   Q1: Little interest or pleasure in doing things Not at all   Q2: Feeling down, depressed or hopeless Not at all   PHQ-2 Score 0       Abuse: Current or Past(Physical, Sexual or Emotional)- No  Do you feel safe in your environment - Yes    Social History   Substance Use Topics     Smoking status: Never Smoker     Smokeless tobacco: Never Used     Alcohol use No     The patient does not drink >3 drinks per day nor >7 drinks per week.    Reviewed orders with patient.  Reviewed health maintenance and updated orders accordingly - Yes  Patient Active Problem List   Diagnosis     Hyperlipidemia with target LDL less than 100     Left hemiplegia (H)     Fibroid uterus     HPV in female     Diabetic nephropathy with proteinuria (H)     Cerebrovascular accident (CVA), unspecified mechanism (H)     Essential hypertension with goal blood pressure less than 140/90     Lumbago     Type 2 diabetes mellitus with diabetic neuropathy, without long-term current use of insulin (H)     History reviewed. No pertinent surgical history.    Social History   Substance Use Topics     Smoking status: Never Smoker     Smokeless tobacco: Never Used     Alcohol use No     Family History   Problem Relation Age of Onset     Breast Cancer Mother       "Hypertension Brother              Patient under age 50, mutual decision reflected in health maintenance.        Pertinent mammograms are reviewed under the imaging tab.  History of abnormal Pap smear: NO - age 30-65 PAP every 5 years with negative HPV co-testing recommended    Reviewed and updated as needed this visit by clinical staff  Tobacco  Allergies  Meds  Med Hx  Surg Hx  Fam Hx  Soc Hx          ROS:  C: NEGATIVE for fever, chills, change in weight  I: NEGATIVE for worrisome rashes, moles or lesions  E: NEGATIVE for vision changes or irritation  ENT: NEGATIVE for ear, mouth and throat problems  R: NEGATIVE for significant cough or SOB  B: NEGATIVE for masses, tenderness or discharge  CV: NEGATIVE for chest pain, palpitations or peripheral edema  GI: NEGATIVE for nausea, abdominal pain, heartburn, or change in bowel habits  : NEGATIVE for unusual urinary or vaginal symptoms. Periods are regular.  M: NEGATIVE for significant arthralgias or myalgia  NEURO: history of stroke with subsequent left sided hemiplegia  P: NEGATIVE for changes in mood or affect     OBJECTIVE:   /80 (BP Location: Right arm, Patient Position: Sitting, Cuff Size: Adult Large)  Pulse 89  Temp 97.8  F (36.6  C) (Oral)  Resp 15  Ht 5' 3\" (1.6 m)  Wt 202 lb 1 oz (91.7 kg)  LMP 09/22/2017  SpO2 98%  BMI 35.79 kg/m2  EXAM:  GENERAL: healthy, alert and no distress  EYES: Eyes grossly normal to inspection, PERRL and conjunctivae and sclerae normal  HENT: ear canals and TM's normal, nose and mouth without ulcers or lesions  NECK: no adenopathy, no asymmetry, masses, or scars and thyroid normal to palpation  RESP: lungs clear to auscultation - no rales, rhonchi or wheezes  BREAST: normal without masses, tenderness or nipple discharge and no palpable axillary masses or adenopathy  CV: regular rate and rhythm, normal S1 S2, no S3 or S4, no murmur, click or rub, no peripheral edema and peripheral pulses strong  ABDOMEN: soft, " "nontender, no hepatosplenomegaly, no masses and bowel sounds normal   (female): normal female external genitalia, normal urethral meatus, vaginal mucosa pink, moist, well rugated, and normal cervix/adnexa/uterus without masses or discharge  MS: no gross musculoskeletal defects noted, no edema  SKIN: no suspicious lesions or rashes  NEURO: residual left sided weakness and decreased mobility.   PSYCH: mentation appears normal, affect normal/bright    ASSESSMENT/PLAN:   1. Well woman exam with routine gynecological exam  Due for pap and mammogram. Counseling as below.   - Pap imaged thin layer screen with HPV - recommended age 30 - 65  - HPV High Risk Types DNA Cervical  - *MA Screening Digital Bilateral; Future    2. Type 2 diabetes mellitus with diabetic neuropathy, without long-term current use of insulin (H)  HgbA1c well controlled on current regimen of Januvia, glimepiride and metformin.  - FOOT EXAM    3. Encounter for screening mammogram for breast cancer  - *MA Screening Digital Bilateral; Future    4. Left hemiplegia (H)  Stable, secondary to CVA. Uses walker for mobility.     5. History of CVA (cerebrovascular accident)  On Plavix, Lipitor.     6. Essential hypertension with goal blood pressure less than 140/90  Well controlled on current regiment of losartan. Labs up to date.       COUNSELING:  Reviewed preventive health counseling, as reflected in patient instructions  Special attention given to:        Regular exercise       Healthy diet/nutrition       Contraception         reports that she has never smoked. She has never used smokeless tobacco.    Estimated body mass index is 35.61 kg/(m^2) as calculated from the following:    Height as of 3/27/17: 5' 3\" (1.6 m).    Weight as of 6/28/17: 201 lb (91.2 kg).   Weight management plan: Discussed healthy diet and exercise guidelines and patient will follow up in 12 months in clinic to re-evaluate.    Counseling Resources:  ATP IV Guidelines  Pooled Cohorts " Equation Calculator  Breast Cancer Risk Calculator  FRAX Risk Assessment  ICSI Preventive Guidelines  Dietary Guidelines for Americans, 2010  Damage Hounds's MyPlate  ASA Prophylaxis  Lung CA Screening    Ivory Velasquez MD  [facility]  Answers for HPI/ROS submitted by the patient on 10/16/2017   PHQ-2 Score: 0

## 2017-10-18 LAB
COPATH REPORT: NORMAL
PAP: NORMAL

## 2017-10-23 LAB
FINAL DIAGNOSIS: NORMAL
HPV HR 12 DNA CVX QL NAA+PROBE: NEGATIVE
HPV16 DNA SPEC QL NAA+PROBE: NEGATIVE
HPV18 DNA SPEC QL NAA+PROBE: NEGATIVE
SPECIMEN DESCRIPTION: NORMAL

## 2017-11-08 ENCOUNTER — TRANSFERRED RECORDS (OUTPATIENT)
Dept: HEALTH INFORMATION MANAGEMENT | Facility: CLINIC | Age: 47
End: 2017-11-08

## 2017-12-09 DIAGNOSIS — I63.9 CEREBROVASCULAR ACCIDENT (CVA), UNSPECIFIED MECHANISM (H): ICD-10-CM

## 2017-12-12 RX ORDER — GLIMEPIRIDE 2 MG/1
TABLET ORAL
Qty: 90 TABLET | Refills: 1 | Status: SHIPPED | OUTPATIENT
Start: 2017-12-12 | End: 2018-04-17

## 2017-12-12 NOTE — TELEPHONE ENCOUNTER
Prescription approved per [facility] Refill Protocol.    Anum MIDDLETON RN, BSN, PHN  Drift Flex RN

## 2018-02-09 DIAGNOSIS — I63.9 CEREBROVASCULAR ACCIDENT (CVA), UNSPECIFIED MECHANISM (H): ICD-10-CM

## 2018-02-09 DIAGNOSIS — I10 ESSENTIAL HYPERTENSION WITH GOAL BLOOD PRESSURE LESS THAN 140/90: Primary | ICD-10-CM

## 2018-02-10 NOTE — TELEPHONE ENCOUNTER
"Requested Prescriptions   Pending Prescriptions Disp Refills     amLODIPine (NORVASC) 10 MG tablet [Pharmacy Med Name: AMLODIPINE  10MG  TAB]  Last Written Prescription Date:  3/27/2017  Last Fill Quantity: 90 tablet,  # refills: 3   Last office visit: 10/16/2017 with prescribing provider:  Ivory Hawk   Future Office Visit:     90 tablet      Sig: TAKE 1 TABLET BY MOUTH  DAILY    Calcium Channel Blockers Protocol  Failed    2/9/2018  3:01 PM       Failed - Normal serum creatinine on file in past 12 months    Recent Labs   Lab Test  06/29/17   0816   CR  1.33*          Passed - Blood pressure under 140/90    BP Readings from Last 3 Encounters:   10/16/17 136/80   06/28/17 118/74   03/27/17 124/80          Passed - Recent or future visit with authorizing provider    Patient had office visit in the last year or has a visit in the next 30 days with authorizing provider.  See \"Patient Info\" tab in inbasket, or \"Choose Columns\" in Meds & Orders section of the refill encounter.        Passed - Patient is age 18 or older       Passed - No active pregnancy on record       Passed - No positive pregnancy test in past 12 months              clopidogrel (PLAVIX) 75 MG tablet [Pharmacy Med Name: CLOPIDOGREL  75MG  TAB]  Last Written Prescription Date:  3/27/2017  Last Fill Quantity: 90 tablet,  # refills: 3   Last office visit: 10/16/2017 with prescribing provider:  Ivory Hawk   Future Office Visit:     90 tablet      Sig: TAKE 1 TABLET BY MOUTH  DAILY    Plavix Failed    2/9/2018  3:01 PM       Failed - Normal HGB on file in past 12 months    Recent Labs   Lab Test  09/07/16   1237   HGB  12.1          Failed - Normal Platelets on file in past 12 months    Recent Labs   Lab Test  09/07/16   1237   PLT  359          Passed - No active PPI on record unless is Protonix       Passed - Recent or future visit with authorizing provider's specialty    Patient had office visit in the last year or has a visit in the next 30 days " "with authorizing provider.  See \"Patient Info\" tab in inbasket, or \"Choose Columns\" in Meds & Orders section of the refill encounter.        Passed - Patient is age 18 or older       Passed - No active pregnancy on record       Passed - No positive pregnancy test in past 12 months          "

## 2018-02-12 RX ORDER — CLOPIDOGREL BISULFATE 75 MG/1
TABLET ORAL
Qty: 90 TABLET | Refills: 0 | Status: SHIPPED | OUTPATIENT
Start: 2018-02-12 | End: 2018-04-17

## 2018-02-12 RX ORDER — AMLODIPINE BESYLATE 10 MG/1
TABLET ORAL
Qty: 90 TABLET | Refills: 0 | Status: SHIPPED | OUTPATIENT
Start: 2018-02-12 | End: 2018-04-17

## 2018-02-12 NOTE — TELEPHONE ENCOUNTER
Prescription approved per [facility] Refill Protocol.  Blanca Ann, RN  Triage Nurse

## 2018-04-17 ENCOUNTER — OFFICE VISIT (OUTPATIENT)
Dept: PEDIATRICS | Facility: CLINIC | Age: 48
End: 2018-04-17
Payer: COMMERCIAL

## 2018-04-17 VITALS
HEIGHT: 63 IN | WEIGHT: 209.44 LBS | TEMPERATURE: 97.9 F | OXYGEN SATURATION: 98 % | SYSTOLIC BLOOD PRESSURE: 120 MMHG | BODY MASS INDEX: 37.11 KG/M2 | HEART RATE: 98 BPM | DIASTOLIC BLOOD PRESSURE: 74 MMHG

## 2018-04-17 DIAGNOSIS — N30.00 ACUTE CYSTITIS WITHOUT HEMATURIA: ICD-10-CM

## 2018-04-17 DIAGNOSIS — I10 ESSENTIAL HYPERTENSION WITH GOAL BLOOD PRESSURE LESS THAN 140/90: ICD-10-CM

## 2018-04-17 DIAGNOSIS — E78.5 HYPERLIPIDEMIA WITH TARGET LDL LESS THAN 100: ICD-10-CM

## 2018-04-17 DIAGNOSIS — Z13.29 SCREENING FOR THYROID DISORDER: ICD-10-CM

## 2018-04-17 DIAGNOSIS — E11.40 TYPE 2 DIABETES MELLITUS WITH DIABETIC NEUROPATHY, WITHOUT LONG-TERM CURRENT USE OF INSULIN (H): Primary | ICD-10-CM

## 2018-04-17 DIAGNOSIS — E11.40 TYPE 2 DIABETES MELLITUS WITH DIABETIC NEUROPATHY, WITHOUT LONG-TERM CURRENT USE OF INSULIN (H): ICD-10-CM

## 2018-04-17 DIAGNOSIS — G81.94 LEFT HEMIPLEGIA (H): ICD-10-CM

## 2018-04-17 DIAGNOSIS — I63.9 CEREBROVASCULAR ACCIDENT (CVA), UNSPECIFIED MECHANISM (H): ICD-10-CM

## 2018-04-17 LAB
ALBUMIN UR-MCNC: >=300 MG/DL
APPEARANCE UR: CLEAR
BACTERIA #/AREA URNS HPF: ABNORMAL /HPF
BILIRUB UR QL STRIP: NEGATIVE
COLOR UR AUTO: YELLOW
GLUCOSE UR STRIP-MCNC: NEGATIVE MG/DL
HBA1C MFR BLD: 6.8 % (ref 0–5.6)
HGB UR QL STRIP: ABNORMAL
KETONES UR STRIP-MCNC: NEGATIVE MG/DL
LEUKOCYTE ESTERASE UR QL STRIP: ABNORMAL
NITRATE UR QL: NEGATIVE
NON-SQ EPI CELLS #/AREA URNS LPF: ABNORMAL /LPF
PH UR STRIP: 5.5 PH (ref 5–7)
RBC #/AREA URNS AUTO: ABNORMAL /HPF
SOURCE: ABNORMAL
SP GR UR STRIP: 1.02 (ref 1–1.03)
UROBILINOGEN UR STRIP-ACNC: 0.2 EU/DL (ref 0.2–1)
WBC #/AREA URNS AUTO: ABNORMAL /HPF

## 2018-04-17 PROCEDURE — 87086 URINE CULTURE/COLONY COUNT: CPT | Performed by: INTERNAL MEDICINE

## 2018-04-17 PROCEDURE — 83036 HEMOGLOBIN GLYCOSYLATED A1C: CPT | Performed by: INTERNAL MEDICINE

## 2018-04-17 PROCEDURE — 84439 ASSAY OF FREE THYROXINE: CPT | Performed by: INTERNAL MEDICINE

## 2018-04-17 PROCEDURE — 87088 URINE BACTERIA CULTURE: CPT | Performed by: INTERNAL MEDICINE

## 2018-04-17 PROCEDURE — 36415 COLL VENOUS BLD VENIPUNCTURE: CPT | Performed by: INTERNAL MEDICINE

## 2018-04-17 PROCEDURE — 87186 SC STD MICRODIL/AGAR DIL: CPT | Performed by: INTERNAL MEDICINE

## 2018-04-17 PROCEDURE — 84443 ASSAY THYROID STIM HORMONE: CPT | Performed by: INTERNAL MEDICINE

## 2018-04-17 PROCEDURE — 81001 URINALYSIS AUTO W/SCOPE: CPT | Performed by: INTERNAL MEDICINE

## 2018-04-17 PROCEDURE — 99214 OFFICE O/P EST MOD 30 MIN: CPT | Performed by: INTERNAL MEDICINE

## 2018-04-17 RX ORDER — CLOPIDOGREL BISULFATE 75 MG/1
75 TABLET ORAL DAILY
Qty: 90 TABLET | Refills: 3 | Status: SHIPPED | OUTPATIENT
Start: 2018-04-17

## 2018-04-17 RX ORDER — ATORVASTATIN CALCIUM 20 MG/1
20 TABLET, FILM COATED ORAL DAILY
Qty: 90 TABLET | Refills: 3 | Status: SHIPPED | OUTPATIENT
Start: 2018-04-17

## 2018-04-17 RX ORDER — GLIMEPIRIDE 2 MG/1
TABLET ORAL
Qty: 90 TABLET | Refills: 1 | Status: SHIPPED | OUTPATIENT
Start: 2018-04-17 | End: 2018-09-14

## 2018-04-17 RX ORDER — LOSARTAN POTASSIUM 50 MG/1
50 TABLET ORAL DAILY
Qty: 90 TABLET | Refills: 3 | Status: SHIPPED | OUTPATIENT
Start: 2018-04-17

## 2018-04-17 RX ORDER — AMLODIPINE BESYLATE 10 MG/1
TABLET ORAL
Qty: 90 TABLET | Refills: 1 | Status: SHIPPED | OUTPATIENT
Start: 2018-04-17 | End: 2018-09-14

## 2018-04-17 RX ORDER — CIPROFLOXACIN 250 MG/1
250 TABLET, FILM COATED ORAL 2 TIMES DAILY
Qty: 6 TABLET | Refills: 0 | Status: SHIPPED | OUTPATIENT
Start: 2018-04-17 | End: 2018-09-14

## 2018-04-17 NOTE — MR AVS SNAPSHOT
After Visit Summary   4/17/2018    Kelly Polk    MRN: [id number]           Patient Information     Date Of Birth          1970        Visit Information        Provider Department      4/17/2018 11:30 AM Ivory Hawk MD [facility]        Today's Diagnoses     Type 2 diabetes mellitus with diabetic neuropathy, without long-term current use of insulin (H)    -  1    Screening for thyroid disorder        Urinary urgency        Essential hypertension with goal blood pressure less than 140/90        Cerebrovascular accident (CVA), unspecified mechanism (H)        Hyperlipidemia with target LDL less than 100        Acute cystitis without hematuria          Care Instructions    No medication changes today.    Work on your pasta/carb reduction and exercise. Come back in 3 months to recheck your levels.     If your abdominal pain doesn't improve with weight loss then I want you to come back.     For your urinary tract infection, start ciprofloxacin 1 tab twice daily for 3 days. Call if your symptoms do not improve with this. Be careful, as this medication may interact with your glimepiride to cause lower blood sugars. Stop immediately if you get joint or tendon pains.           Follow-ups after your visit        Who to contact     If you have questions or need follow up information about today's clinic visit or your schedule please contact [facility] directly at [phone].  Normal or non-critical lab and imaging results will be communicated to you by MyChart, letter or phone within 4 business days after the clinic has received the results. If you do not hear from us within 7 days, please contact the clinic through MyChart or phone. If you have a critical or abnormal lab result, we will notify you by phone as soon as possible.  Submit refill requests through [employer] or call your pharmacy and they will forward the refill request to us. Please allow 3 business days for your  "refill to be completed.          Additional Information About Your Visit        [employer] Information     [employer] lets you send messages to your doctor, view your test results, renew your prescriptions, schedule appointments and more. To sign up, go to [email or web address] . Click on \"Log in\" on the left side of the screen, which will take you to the Welcome page. Then click on \"Sign up Now\" on the right side of the page.     You will be asked to enter the access code listed below, as well as some personal information. Please follow the directions to create your username and password.     Your access code is: [email or web address]  Expires: 2018 10:25 AM     Your access code will  in 90 days. If you need help or a new code, please call your [place] clinic or [phone].        Care EveryWhere ID     This is your [facility] EveryWhere ID. This could be used by other organizations to access your [place] medical records  [id number]        Your Vitals Were     Pulse Temperature Height Pulse Oximetry Breastfeeding? BMI (Body Mass Index)    98 97.9  F (36.6  C) (Oral) 5' 3\" (1.6 m) 98% No 37.1 kg/m2       Blood Pressure from Last 3 Encounters:   18 120/74   10/16/17 136/80   17 118/74    Weight from Last 3 Encounters:   18 209 lb 7 oz (95 kg)   10/16/17 202 lb 1 oz (91.7 kg)   17 201 lb (91.2 kg)              We Performed the Following     *UA reflex to Microscopic and Culture ([place] and [facility] (except [place] and [place])     Urine Microscopic          Today's Medication Changes          These changes are accurate as of 18 12:12 PM.  If you have any questions, ask your nurse or doctor.               Start taking these medicines.        Dose/Directions    ciprofloxacin 250 MG tablet   Commonly known as:  CIPRO   Used for:  Acute cystitis without hematuria   Started by:  Ivory Hawk MD        Dose:  250 mg   Take 1 tablet (250 mg) by mouth 2 times daily   Quantity:  6 " tablet   Refills:  0         These medicines have changed or have updated prescriptions.        Dose/Directions    amLODIPine 10 MG tablet   Commonly known as:  NORVASC   This may have changed:  See the new instructions.   Used for:  Essential hypertension with goal blood pressure less than 140/90   Changed by:  Ivory Hawk MD        TAKE 1 TABLET BY MOUTH  DAILY   Quantity:  90 tablet   Refills:  1       clopidogrel 75 MG tablet   Commonly known as:  PLAVIX   This may have changed:  See the new instructions.   Used for:  Cerebrovascular accident (CVA), unspecified mechanism (H)   Changed by:  Ivory Hawk MD        Dose:  75 mg   Take 1 tablet (75 mg) by mouth daily   Quantity:  90 tablet   Refills:  3       glimepiride 2 MG tablet   Commonly known as:  AMARYL   This may have changed:  See the new instructions.   Used for:  Cerebrovascular accident (CVA), unspecified mechanism (H)   Changed by:  Ivory Hawk MD        TAKE 1 TABLET BY MOUTH  EVERY MORNING (BEFORE  BREAKFAST)   Quantity:  90 tablet   Refills:  1            Where to get your medicines      These medications were sent to [facility]/pharmacy #[id number] - [place], [place] - [address] AT [address], [place] [address]     Phone:  [phone]     ciprofloxacin 250 MG tablet         These medications were sent to [facility] - [address], [facility] [address]     Phone:  [phone]     amLODIPine 10 MG tablet    atorvastatin 20 MG tablet    clopidogrel 75 MG tablet    glimepiride 2 MG tablet    losartan 50 MG tablet                Primary Care Provider Office Phone # Fax #    Willis Chen [phone]       [address] [place] [address] [address] [place] [address]        Equal Access to Services     TOI BRUMFIELD AH: El Gregory, serg baig, qaybta eve randall .  So [facility] [phone].    ATENCIÓN: Si manjinder mariano, tiene a rader disposición servicios gratuitos de asistencia lingüística. James husain [phone].    We comply with applicable federal civil rights laws and [place] laws. We do not discriminate on the basis of race, color, national origin, age, disability, sex, sexual orientation, or gender identity.            Thank you!     Thank you for choosing [facility] [place]  for your care. Our goal is always to provide you with excellent care. Hearing back from our patients is one way we can continue to improve our services. Please take a few minutes to complete the written survey that you may receive in the mail after your visit with us. Thank you!             Your Updated Medication List - Protect others around you: Learn how to safely use, store and throw away your medicines at www.disposemymeds.org.          This list is accurate as of 4/17/18 12:12 PM.  Always use your most recent med list.                   Brand Name Dispense Instructions for use Diagnosis    amLODIPine 10 MG tablet    NORVASC    90 tablet    TAKE 1 TABLET BY MOUTH  DAILY    Essential hypertension with goal blood pressure less than 140/90       ASPIRIN NOT PRESCRIBED    INTENTIONAL    0 each    1 each daily Antiplatelet medication not prescribed intentionally due to on Plavix    Type 2 diabetes mellitus with hyperglycemia (H)       atorvastatin 20 MG tablet    LIPITOR    90 tablet    Take 1 tablet (20 mg) by mouth daily    Hyperlipidemia with target LDL less than 100       blood glucose monitoring test strip    no brand specified    180 each    Use to test blood sugars 2 times daily or as directed    Type 2 diabetes mellitus with diabetic neuropathy, without long-term current use of insulin (H)       ciprofloxacin 250 MG tablet    CIPRO    6 tablet    Take 1 tablet (250 mg) by mouth 2 times daily    Acute cystitis without hematuria       clopidogrel 75 MG tablet    PLAVIX    90 tablet    Take 1  tablet (75 mg) by mouth daily    Cerebrovascular accident (CVA), unspecified mechanism (H)       glimepiride 2 MG tablet    AMARYL    90 tablet    TAKE 1 TABLET BY MOUTH  EVERY MORNING (BEFORE  BREAKFAST)    Cerebrovascular accident (CVA), unspecified mechanism (H)       JANUVIA 50 MG tablet   Generic drug:  sitagliptin     90 tablet    TAKE 1 TABLET BY MOUTH  DAILY WITH BREAKFAST    Type 2 diabetes mellitus with diabetic neuropathy, without long-term current use of insulin (H)       losartan 50 MG tablet    COZAAR    90 tablet    Take 1 tablet (50 mg) by mouth daily    Essential hypertension with goal blood pressure less than 140/90       metFORMIN 500 MG 24 hr tablet    GLUCOPHAGE-XR    90 tablet    TAKE 1 TABLET BY MOUTH  DAILY    Type 2 diabetes mellitus with diabetic neuropathy, without long-term current use of insulin (H)       order for DME     1 Units    Accu-check glucometer or per insurance coverage    Type 2 diabetes mellitus with diabetic neuropathy (H)       order for DME     180 Units    Accu-check glucometer test lancets, or per insurance coverage  Check QAM and QPM    Type 2 diabetes mellitus with diabetic neuropathy (H)       * order for DME     180 Units    Accu-check glucometer test strips or per insurance coverage  Check QAM and QPM    Type 2 diabetes mellitus with diabetic neuropathy (H)       * order for DME     1 Units    Equipment being ordered: left AFO brace    Left foot drop       order for DME     2 Units    Equipment being ordered: knee high compression stockings, 30-40mmHg    Edema, unspecified type       * order for DME     1 Device    Seated walker with basket    Left hemiplegia (H)       * Notice:  This list has 3 medication(s) that are the same as other medications prescribed for you. Read the directions carefully, and ask your doctor or other care provider to review them with you.

## 2018-04-17 NOTE — PATIENT INSTRUCTIONS
No medication changes today.    Work on your pasta/carb reduction and exercise. Come back in 3 months to recheck your levels.     If your abdominal pain doesn't improve with weight loss then I want you to come back.     For your urinary tract infection, start ciprofloxacin 1 tab twice daily for 3 days. Call if your symptoms do not improve with this. Be careful, as this medication may interact with your glimepiride to cause lower blood sugars. Stop immediately if you get joint or tendon pains.

## 2018-04-17 NOTE — PROGRESS NOTES
"  SUBJECTIVE:   Kelly Polk is a 47 year old female who presents to clinic today for the following health issues:      Diabetes Follow-up      Patient is checking blood sugars: \"sometimes\"     Diabetic concerns: None     Symptoms of hypoglycemia (low blood sugar): none     Paresthesias (numbness or burning in feet) or sores: No     Date of last diabetic eye exam: June 2017    Hyperlipidemia Follow-Up      Rate your low fat/cholesterol diet?: fair    Taking statin?  Yes, no muscle aches from statin    Other lipid medications/supplements?:  none    Hypertension Follow-up      Outpatient blood pressures are being checked at home.  Results are 110/76.    Low Salt Diet: no added salt    BP Readings from Last 2 Encounters:   10/16/17 136/80   06/28/17 118/74     Hemoglobin A1C (%)   Date Value   06/29/2017 5.7   03/27/2017 6.2 (H)     LDL Cholesterol Calculated (mg/dL)   Date Value   06/29/2017 124 (H)   08/12/2016 93       Amount of exercise or physical activity: None    Problems taking medications regularly: No    Medication side effects: none    Diet: regular (no restrictions) and low salt    Kelly comes in for follow-up of her diabetes.  She reports that she has been taking her medications as prescribed and without significant issues.  She does note that she has been exercising less over the winter, and has been eating more carbohydrates than she usually would.  She does not check her blood sugars regularly, and denies any issues with low blood sugars.    She is tolerating her blood pressure medications well without side effects.  No chest pain or shortness of breath.    She has been having some symptoms of urinary urgency and frequency recently, and wonders if she could have a urinary tract infection.    Problem list and histories reviewed & adjusted, as indicated.  Additional history: as documented    Patient Active Problem List   Diagnosis     Hyperlipidemia with target LDL less than 100     Left hemiplegia (H) " "    Fibroid uterus     Cervical high risk HPV (human papillomavirus) test positive     Diabetic nephropathy with proteinuria (H)     Cerebrovascular accident (CVA), unspecified mechanism (H)     Essential hypertension with goal blood pressure less than 140/90     Lumbago     Type 2 diabetes mellitus with diabetic neuropathy, without long-term current use of insulin (H)     Subclinical hypothyroidism     No past surgical history on file.    Social History   Substance Use Topics     Smoking status: Never Smoker     Smokeless tobacco: Never Used     Alcohol use No     Family History   Problem Relation Age of Onset     Breast Cancer Mother      Hypertension Brother            Reviewed and updated as needed this visit by clinical staff  Tobacco  Allergies  Meds  Med Hx  Fam Hx  Soc Hx      ROS:  Constitutional, HEENT, cardiovascular, pulmonary, gi and gu systems are negative, except as otherwise noted.    OBJECTIVE:     /74 (BP Location: Right arm, Patient Position: Chair, Cuff Size: Adult Large)  Pulse 98  Temp 97.9  F (36.6  C) (Oral)  Ht 5' 3\" (1.6 m)  Wt 209 lb 7 oz (95 kg)  SpO2 98%  Breastfeeding? No  BMI 37.1 kg/m2  Body mass index is 37.1 kg/(m^2).  GENERAL: healthy, alert and no distress  RESP: lungs clear to auscultation - no rales, rhonchi or wheezes  CV: regular rate and rhythm, normal S1 S2, no S3 or S4, no murmur  SKIN: no suspicious lesions or rashes  NEURO: L sided hemiplegia, stable from prior    Diagnostic Test Results:  Results for orders placed or performed in visit on 04/17/18   *UA reflex to Microscopic and Culture ([place] and [facility]] (except [place] and [place])   Result Value Ref Range    Color Urine Yellow     Appearance Urine Clear     Glucose Urine Negative NEG^Negative mg/dL    Bilirubin Urine Negative NEG^Negative    Ketones Urine Negative NEG^Negative mg/dL    Specific Gravity Urine 1.020 1.003 - 1.035    Blood Urine Trace (A) NEG^Negative    pH Urine 5.5 5.0 - " 7.0 pH    Protein Albumin Urine >=300 (A) NEG^Negative mg/dL    Urobilinogen Urine 0.2 0.2 - 1.0 EU/dL    Nitrite Urine Negative NEG^Negative    Leukocyte Esterase Urine Trace (A) NEG^Negative    Source Midstream Urine    Urine Microscopic   Result Value Ref Range    WBC Urine 25-50 (A) OTO5^0 - 5 /HPF    RBC Urine 2-5 (A) OTO2^O - 2 /HPF    Squamous Epithelial /LPF Urine Moderate (A) FEW^Few /LPF    Bacteria Urine Many (A) NEG^Negative /HPF   Urine Culture Aerobic Bacterial   Result Value Ref Range    Specimen Description Midstream Urine     Culture Micro >100,000 colonies/mL  Klebsiella pneumoniae   (A)     Culture Micro       10,000 to 50,000 colonies/mL  mixed urogenital eulalia  Susceptibility testing not routinely done         Susceptibility    Klebsiella pneumoniae - ARTIE     AMPICILLIN*  Resistant ug/mL      * Intrinsically Resistant     CEFAZOLIN* <=4 Sensitive ug/mL      * Cefazolin ARTIE breakpoints are for the treatment of uncomplicated urinary tract infections.  For the treatment of systemic infections, please contact the laboratory for additional testing.     CEFOXITIN <=4 Sensitive ug/mL     CEFTAZIDIME <=1 Sensitive ug/mL     CEFTRIAXONE <=1 Sensitive ug/mL     CIPROFLOXACIN <=0.25 Sensitive ug/mL     GENTAMICIN <=1 Sensitive ug/mL     LEVOFLOXACIN <=0.12 Sensitive ug/mL     NITROFURANTOIN 32 Sensitive ug/mL     TOBRAMYCIN <=1 Sensitive ug/mL     Trimethoprim/Sulfa <=1/19 Sensitive ug/mL     AMPICILLIN/SULBACTAM 4 Sensitive ug/mL     Piperacillin/Tazo <=4 Sensitive ug/mL     CEFEPIME <=1 Sensitive ug/mL       ASSESSMENT/PLAN:     1. Type 2 diabetes mellitus with diabetic neuropathy, without long-term current use of insulin (H)  Well-controlled by current testing.  However, patient is quite frustrated that her hemoglobin A1c has risen by 1.  She attributes this to decreased exercise and increase carbohydrate intake, which I think is likely the case.  As she is well controlled, we will hold off on further  medication adjustment, and she will work on lifestyle modifications to bring this closer to her personal target goal of near 6-6.5.  We did review that there are risks for lower A1c targets, she expressed understanding.  - Hemoglobin A1c; Future  - Urine Microscopic  - glimepiride (AMARYL) 2 MG tablet; TAKE 1 TABLET BY MOUTH  EVERY MORNING (BEFORE  BREAKFAST)  Dispense: 90 tablet; Refill: 1    2. Screening for thyroid disorder  - TSH with free T4 reflex; Future    3. Essential hypertension with goal blood pressure less than 140/90  Well-controlled on current medication regimen, medications refilled.  - amLODIPine (NORVASC) 10 MG tablet; TAKE 1 TABLET BY MOUTH  DAILY  Dispense: 90 tablet; Refill: 1  - losartan (COZAAR) 50 MG tablet; Take 1 tablet (50 mg) by mouth daily  Dispense: 90 tablet; Refill: 3    4. Cerebrovascular accident (CVA), unspecified mechanism (H)  Resulting in left hemiplegia, which is stable.  Clopidogrel refilled.  - clopidogrel (PLAVIX) 75 MG tablet; Take 1 tablet (75 mg) by mouth daily  Dispense: 90 tablet; Refill: 3      5. Left hemiplegia (H)  Stable.    6. Hyperlipidemia with target LDL less than 100  Medications refilled.  - atorvastatin (LIPITOR) 20 MG tablet; Take 1 tablet (20 mg) by mouth daily  Dispense: 90 tablet; Refill: 3    7. Acute cystitis without hematuria  UA consistent with urinary tract infection, will treat with ciprofloxacin as she is not a candidate for nitrofurantoin given known CKD.  Reviewed medication side effects with patient, including tendinopathy.  She expresses understanding.  - ciprofloxacin (CIPRO) 250 MG tablet; Take 1 tablet (250 mg) by mouth 2 times daily  Dispense: 6 tablet; Refill: 0    Patient Instructions   No medication changes today.    Work on your pasta/carb reduction and exercise. Come back in 3 months to recheck your levels.     If your abdominal pain doesn't improve with weight loss then I want you to come back.     For your urinary tract infection,  start ciprofloxacin 1 tab twice daily for 3 days. Call if your symptoms do not improve with this. Be careful, as this medication may interact with your glimepiride to cause lower blood sugars. Stop immediately if you get joint or tendon pains.       Ivory Velasquez MD  [facility]

## 2018-04-18 LAB
T4 FREE SERPL-MCNC: 0.95 NG/DL (ref 0.76–1.46)
TSH SERPL DL<=0.005 MIU/L-ACNC: 7.01 MU/L (ref 0.4–4)

## 2018-04-19 PROBLEM — E03.8 SUBCLINICAL HYPOTHYROIDISM: Status: ACTIVE | Noted: 2018-04-19

## 2018-04-19 LAB
BACTERIA SPEC CULT: ABNORMAL
BACTERIA SPEC CULT: ABNORMAL
SPECIMEN SOURCE: ABNORMAL

## 2018-04-20 PROBLEM — N18.30 CKD (CHRONIC KIDNEY DISEASE) STAGE 3, GFR 30-59 ML/MIN (H): Status: ACTIVE | Noted: 2018-04-20

## 2018-04-24 ENCOUNTER — TRANSFERRED RECORDS (OUTPATIENT)
Dept: HEALTH INFORMATION MANAGEMENT | Facility: CLINIC | Age: 48
End: 2018-04-24

## 2018-04-26 ENCOUNTER — TRANSFERRED RECORDS (OUTPATIENT)
Dept: HEALTH INFORMATION MANAGEMENT | Facility: CLINIC | Age: 48
End: 2018-04-26

## 2018-05-04 ENCOUNTER — OFFICE VISIT (OUTPATIENT)
Dept: UROLOGY | Facility: CLINIC | Age: 48
End: 2018-05-04
Payer: COMMERCIAL

## 2018-05-04 VITALS
HEART RATE: 104 BPM | OXYGEN SATURATION: 97 % | HEIGHT: 63 IN | WEIGHT: 209 LBS | DIASTOLIC BLOOD PRESSURE: 68 MMHG | BODY MASS INDEX: 37.03 KG/M2 | SYSTOLIC BLOOD PRESSURE: 110 MMHG

## 2018-05-04 DIAGNOSIS — Z87.440 PERSONAL HISTORY OF URINARY TRACT INFECTION: Primary | ICD-10-CM

## 2018-05-04 LAB
ALBUMIN UR-MCNC: 100 MG/DL
APPEARANCE UR: CLEAR
BACTERIA #/AREA URNS HPF: ABNORMAL /HPF
BILIRUB UR QL STRIP: NEGATIVE
COLOR UR AUTO: YELLOW
GLUCOSE UR STRIP-MCNC: NEGATIVE MG/DL
HGB UR QL STRIP: ABNORMAL
KETONES UR STRIP-MCNC: NEGATIVE MG/DL
LEUKOCYTE ESTERASE UR QL STRIP: NEGATIVE
MUCOUS THREADS #/AREA URNS LPF: PRESENT /LPF
NITRATE UR QL: NEGATIVE
PH UR STRIP: 5.5 PH (ref 5–7)
RBC #/AREA URNS AUTO: 1 /HPF (ref 0–2)
RESIDUAL VOLUME (RV) (EXTERNAL): 0
SOURCE: ABNORMAL
SP GR UR STRIP: 1.01 (ref 1–1.03)
SQUAMOUS #/AREA URNS AUTO: 2 /HPF (ref 0–1)
UROBILINOGEN UR STRIP-ACNC: 0.2 EU/DL (ref 0.2–1)
WBC #/AREA URNS AUTO: 1 /HPF (ref 0–5)

## 2018-05-04 PROCEDURE — 99203 OFFICE O/P NEW LOW 30 MIN: CPT | Mod: 25 | Performed by: PHYSICIAN ASSISTANT

## 2018-05-04 PROCEDURE — 81001 URINALYSIS AUTO W/SCOPE: CPT | Performed by: PHYSICIAN ASSISTANT

## 2018-05-04 PROCEDURE — 51798 US URINE CAPACITY MEASURE: CPT | Performed by: PHYSICIAN ASSISTANT

## 2018-05-04 PROCEDURE — 87109 MYCOPLASMA: CPT | Performed by: PHYSICIAN ASSISTANT

## 2018-05-04 ASSESSMENT — PAIN SCALES - GENERAL: PAINLEVEL: NO PAIN (0)

## 2018-05-04 NOTE — MR AVS SNAPSHOT
After Visit Summary   5/4/2018    Kelly Polk    MRN: [id number]           Patient Information     Date Of Birth          1970        Visit Information        Provider Department      5/4/2018 2:00 PM Krystal López PA-C [facility] [place]        Today's Diagnoses     Personal history of urinary tract infection    -  1      Care Instructions    1. Urinalysis and culture  2. Mycoplasma, Ureaplasma cultures  3. Renal u/s  4. Cystoscopy to look inside the bladder.  5. Hydrate with water to keep the urine dilute.   6. Lifestyle and hygiene modifications: wiping front to back, wearing cotton breathable underwear, voiding before and after intercourse to flush the urethra, minimizing baths and opting for showers, and appropriate perineal hygiene.   7. Keep bowels regular    Below is a list of things that can irritate the bladder and should be avoided:      Caffeinated soft drinks.    Coffee.    Tea.    Chocolate.    Tomato-based foods.    Acidic juices and fruits. (includes cranberry juice)    Alcohol.    Carbonated drinks.    Aspartame/Nutrasweet.              Follow-ups after your visit        Your next 10 appointments already scheduled     Jun 08, 2018  9:00 AM CDT   US RENAL COMPLETE with SHUS1   [facility] Ultrasound ([facility])    [address] [place] [address] [phone]           Please bring a list of your medicines (including vitamins, minerals and over-the-counter drugs). Also, tell your doctor about any allergies you may have. Wear comfortable clothes and leave your valuables at home.  You do not need to do anything special to prepare for your exam.  Please call the Imaging Department at your exam site with any questions.            Jun 08, 2018 10:00 AM CDT   Cystoscopy with Jimmy Kennedy MD   [facility] [place] ([facility])    [address]  [address]  [place] [address]   [phone]              Future tests that were ordered for you today     Open Future Orders        Priority Expected Expires Ordered    US Renal Complete [[id number]] Routine  2019            Who to contact     If you have questions or need follow up information about today's clinic visit or your schedule please contact [facility] [place] directly at [phone].  Normal or non-critical lab and imaging results will be communicated to you by [email or web address], letter or phone within 4 business days after the clinic has received the results. If you do not hear from us within 7 days, please contact the clinic through [facility] or phone. If you have a critical or abnormal lab result, we will notify you by phone as soon as possible.  Submit refill requests through [facility] or call your pharmacy and they will forward the refill request to us. Please allow 3 business days for your refill to be completed.          Additional Information About Your Visit        [facility] Information     [facility] lets you send messages to your doctor, view your test results, renew your prescriptions, schedule appointments and more. To sign up, go to [email or web address] . Click on \"Log in\" on the left side of the screen, which will take you to the Welcome page. Then click on \"Sign up Now\" on the right side of the page.     You will be asked to enter the access code listed below, as well as some personal information. Please follow the directions to create your username and password.     Your access code is: [email or web address]  Expires: 2018 10:25 AM     Your access code will  in 90 days. If you need help or a new code, please call your [facility] clinic or [phone].        Care EveryWhere ID     This is your Care EveryWhere ID. This could be used by other organizations to access your [facility] medical records  [id number]        Your Vitals Were     Pulse Height Pulse Oximetry " "BMI (Body Mass Index)          104 1.6 m (5' 3\") 97% 37.02 kg/m2         Blood Pressure from Last 3 Encounters:   05/04/18 110/68   04/17/18 120/74   10/16/17 136/80    Weight from Last 3 Encounters:   05/04/18 94.8 kg (209 lb)   04/17/18 95 kg (209 lb 7 oz)   10/16/17 91.7 kg (202 lb 1 oz)              We Performed the Following     MEASURE POST-VOID RESIDUAL URINE/BLADDER CAPACITY, US NON-IMAGING (17459)     Mycoplasma large colony culture     UA without Microscopic     Ureaplasma culture     Urine Micro Urologic Phys        Primary Care Provider Office Phone # Fax #    Willis Chen [phone] [address] [place] [address]        Equal Access to Services     AYDEE [facility] : Hadii nick main hadasho Soomaali, waaxda luqadaha, qaybta kaalmada adeegyada, waxay allanin haycastro jewell . So [facility] [phone].    ATENCIÓN: Si habla español, tiene a rader disposición servicios gratuitos de asistencia lingüística. James al [phone].    We comply with applicable federal civil rights laws and [place] laws. We do not discriminate on the basis of race, color, national origin, age, disability, sex, sexual orientation, or gender identity.            Thank you!     Thank you for choosing [facility] [place]  for your care. Our goal is always to provide you with excellent care. Hearing back from our patients is one way we can continue to improve our services. Please take a few minutes to complete the written survey that you may receive in the mail after your visit with us. Thank you!             Your Updated Medication List - Protect others around you: Learn how to safely use, store and throw away your medicines at www.disposemymeds.org.          This list is accurate as of 5/4/18  2:48 PM.  Always use your most recent med list.                   Brand Name Dispense Instructions for use Diagnosis    amLODIPine 10 MG tablet    NORVASC    90 " tablet    TAKE 1 TABLET BY MOUTH  DAILY    Essential hypertension with goal blood pressure less than 140/90       ASPIRIN NOT PRESCRIBED    INTENTIONAL    0 each    1 each daily Antiplatelet medication not prescribed intentionally due to on Plavix    Type 2 diabetes mellitus with hyperglycemia (H)       atorvastatin 20 MG tablet    LIPITOR    90 tablet    Take 1 tablet (20 mg) by mouth daily    Hyperlipidemia with target LDL less than 100       blood glucose monitoring test strip    no brand specified    180 each    Use to test blood sugars 2 times daily or as directed    Type 2 diabetes mellitus with diabetic neuropathy, without long-term current use of insulin (H)       ciprofloxacin 250 MG tablet    CIPRO    6 tablet    Take 1 tablet (250 mg) by mouth 2 times daily    Acute cystitis without hematuria       clopidogrel 75 MG tablet    PLAVIX    90 tablet    Take 1 tablet (75 mg) by mouth daily    Cerebrovascular accident (CVA), unspecified mechanism (H)       glimepiride 2 MG tablet    AMARYL    90 tablet    TAKE 1 TABLET BY MOUTH  EVERY MORNING (BEFORE  BREAKFAST)    Cerebrovascular accident (CVA), unspecified mechanism (H)       JANUVIA 50 MG tablet   Generic drug:  sitagliptin     90 tablet    TAKE 1 TABLET BY MOUTH  DAILY WITH BREAKFAST    Type 2 diabetes mellitus with diabetic neuropathy, without long-term current use of insulin (H)       losartan 50 MG tablet    COZAAR    90 tablet    Take 1 tablet (50 mg) by mouth daily    Essential hypertension with goal blood pressure less than 140/90       metFORMIN 500 MG 24 hr tablet    GLUCOPHAGE-XR    90 tablet    TAKE 1 TABLET BY MOUTH  DAILY    Type 2 diabetes mellitus with diabetic neuropathy, without long-term current use of insulin (H)       order for DME     1 Units    Accu-check glucometer or per insurance coverage    Type 2 diabetes mellitus with diabetic neuropathy (H)       order for DME     180 Units    Accu-check glucometer test lancets, or per insurance  coverage  Check QAM and QPM    Type 2 diabetes mellitus with diabetic neuropathy (H)       * order for DME     180 Units    Accu-check glucometer test strips or per insurance coverage  Check QAM and QPM    Type 2 diabetes mellitus with diabetic neuropathy (H)       * order for DME     1 Units    Equipment being ordered: left AFO brace    Left foot drop       order for DME     2 Units    Equipment being ordered: knee high compression stockings, 30-40mmHg    Edema, unspecified type       * order for DME     1 Device    Seated walker with basket    Left hemiplegia (H)       * Notice:  This list has 3 medication(s) that are the same as other medications prescribed for you. Read the directions carefully, and ask your doctor or other care provider to review them with you.

## 2018-05-04 NOTE — PATIENT INSTRUCTIONS
1. Urinalysis and culture  2. Mycoplasma, Ureaplasma cultures  3. Renal u/s  4. Cystoscopy to look inside the bladder.  5. Hydrate with water to keep the urine dilute.   6. Lifestyle and hygiene modifications: wiping front to back, wearing cotton breathable underwear, voiding before and after intercourse to flush the urethra, minimizing baths and opting for showers, and appropriate perineal hygiene.   7. Keep bowels regular    Below is a list of things that can irritate the bladder and should be avoided:      Caffeinated soft drinks.    Coffee.    Tea.    Chocolate.    Tomato-based foods.    Acidic juices and fruits. (includes cranberry juice)    Alcohol.    Carbonated drinks.    Aspartame/Nutrasweet.

## 2018-05-04 NOTE — LETTER
5/4/2018       RE: Kelly Polk  [address]  [facility] [address]     Dear Colleague,    Thank you for referring your patient, Kelly Polk, to the [facility] [place] at [facility]. Please see a copy of my visit note below.    CC: Recurrent UTIs    HPI: It was my pleasure to meet Ms. Kelly Polk, a 47 year old year old female seen in consultation today at the request of Dr. Morrow for recurrent UTIs. She is not immunosuppressed.  Today she complains of a recent history of frequent urinary tract infections, and states has had 2-3 in the last 2months (All Kleb pneumo).  These are typically symptomatic.  Typical symptoms include:  Frequency, urgency, burning. She denies gross hematuria, flank pain, fevers, chills.  She has no history of stones.  She has never been hospitalized for UTIs.       With infection, Ms. Kelly Polk typically goes to [facility] or [facility] where cultures are performed .  Is usually given an antibiotic and reports that symptoms do resolve appropriately with therapy. Therapies that have been tried include: Macrobid, Septra.     -CVA x 2 (2012 and 2016)  -Inciting events include: Patient does not hold urine beyond the urge to void  -Daily Fluid intake: water with minimal caffeine.    -Bowels: constipation    Past Medical History:   Diagnosis Date     Cerebral infarction due to other mechanism (H) 10/7/2015     Cervical high risk HPV (human papillomavirus) test positive 02/08/2016     Diabetes (H)      Essential hypertension, benign 10/7/2015     Hyperlipidemia LDL goal < 100 10/7/2015     Left hemiplegia (H) 10/7/2015       No past surgical history on file.    FAMILY HISTORY: Denies family history of urologic cancer.     SOCIAL HISTORY:    reports that she has never smoked. She has never used smokeless tobacco.    Current Outpatient Prescriptions   Medication Sig Dispense Refill     amLODIPine (NORVASC) 10  "MG tablet TAKE 1 TABLET BY MOUTH  DAILY 90 tablet 1     ASPIRIN NOT PRESCRIBED, INTENTIONAL, 1 each daily Antiplatelet medication not prescribed intentionally due to on Plavix 0 each 0     atorvastatin (LIPITOR) 20 MG tablet Take 1 tablet (20 mg) by mouth daily 90 tablet 3     blood glucose monitoring (NO BRAND SPECIFIED) test strip Use to test blood sugars 2 times daily or as directed 180 each 0     ciprofloxacin (CIPRO) 250 MG tablet Take 1 tablet (250 mg) by mouth 2 times daily 6 tablet 0     clopidogrel (PLAVIX) 75 MG tablet Take 1 tablet (75 mg) by mouth daily 90 tablet 3     glimepiride (AMARYL) 2 MG tablet TAKE 1 TABLET BY MOUTH  EVERY MORNING (BEFORE  BREAKFAST) 90 tablet 1     JANUVIA 50 MG tablet TAKE 1 TABLET BY MOUTH  DAILY WITH BREAKFAST 90 tablet 1     losartan (COZAAR) 50 MG tablet Take 1 tablet (50 mg) by mouth daily 90 tablet 3     metFORMIN (GLUCOPHAGE-XR) 500 MG 24 hr tablet TAKE 1 TABLET BY MOUTH  DAILY 90 tablet 1     order for DME Accu-check glucometer or per insurance coverage 1 Units 0     order for DME Accu-check glucometer test lancets, or per insurance coverage    Check QAM and  Units 3     order for DME Accu-check glucometer test strips or per insurance coverage    Check QAM and  Units 3     order for DME Equipment being ordered: left AFO brace 1 Units 0     order for DME Equipment being ordered: knee high compression stockings, 30-40mmHg 2 Units 0     order for DME Seated walker with basket 1 Device 0       ALLERGIES: Review of patient's allergies indicates no known allergies.      REVIEW OF SYSTEMS: 14-pt ROS was negative other than that noted in the HPI.    GENERAL PHYSICAL EXAM:   /68 (BP Location: Right arm, Patient Position: Sitting, Cuff Size: Adult Regular)  Pulse 104  Ht 1.6 m (5' 3\")  Wt 94.8 kg (209 lb)  SpO2 97%  BMI 37.02 kg/m2  GENERAL: Well groomed, well developed, well nourished female in NAD.  HEAD: Normocephalic. No masses, lesions, tenderness or " abnormalities  NECK: Neck supple. No adenopathy. Thyroid symmetric, normal size  ENT: EOMI  RESPIRATORY: CTAB. No increased respiratory effort.   GI: Soft, NT  MS: Full ROM in extremities, gait normal.  SKIN: Warm to touch, dry.    EXT: No LE edema.  NEURO: Alert and oriented x 3.  PSYCH: Normal mood and affect, pleasant and agreeable during interview and exam.    PVR: Residual urine by ultrasound was 0 ml.   Urine - trace blood    ASSESSMENT:   Recurrent UTIs, DM2, CKD, hx CVA x 2    PLAN:   UA/micro  Mycoplasma, Ureaplasma  Renal u/s  Cysto   Bladder irritant list provided, bowel regimen.  Hydrate  - Lifestyle and hygiene modifications were reviewed today in clinic, including wiping front to back, wearing cotton breathable underwear, voiding before and after intercourse to flush the urethra, minimizing baths and opting for showers, and appropriate perineal hygiene. Push fluids to keep the urine dilute    Krystal López PA-C  [facility]    30 min spent with the patient in a face to face manner, >50% of that time spent on counseling and coordination of care of UTIs.

## 2018-05-04 NOTE — NURSING NOTE
Chief Complaint   Patient presents with     UTI     Pt here today for recurrent UTI     UA RESULTS:  Recent Labs   Lab Test  05/04/18   1427  04/17/18   1134   COLOR  Yellow  Yellow   APPEARANCE  Clear  Clear   URINEGLC  Negative  Negative   URINEBILI  Negative  Negative   URINEKETONE  Negative  Negative   SG  1.010  1.020   UBLD  Trace*  Trace*   URINEPH  5.5  5.5   PROTEIN  100*  >=300*   UROBILINOGEN  0.2  0.2   NITRITE  Negative  Negative   LEUKEST  Negative  Trace*   RBCU   --   2-5*   WBCU   --   25-50*     UA done today shows trace blood      Vi Pierre, CMA

## 2018-05-04 NOTE — PROGRESS NOTES
CC: Recurrent UTIs    HPI: It was my pleasure to meet Ms. Kelly Polk, a 47 year old year old female seen in consultation today at the request of Dr. Morrow for recurrent UTIs. She is not immunosuppressed.  Today she complains of a recent history of frequent urinary tract infections, and states has had 2-3 in the last 2months (All Kleb pneumo).  These are typically symptomatic.  Typical symptoms include:  Frequency, urgency, burning. She denies gross hematuria, flank pain, fevers, chills.  She has no history of stones.  She has never been hospitalized for UTIs.       With infection, Ms. Kelly Polk typically goes to [facility] or [facility] where cultures are performed .  Is usually given an antibiotic and reports that symptoms do resolve appropriately with therapy. Therapies that have been tried include: Macrobid, Septra.     -CVA x 2 (2012 and 2016)  -Inciting events include: Patient does not hold urine beyond the urge to void  -Daily Fluid intake: water with minimal caffeine.    -Bowels: constipation    Past Medical History:   Diagnosis Date     Cerebral infarction due to other mechanism (H) 10/7/2015     Cervical high risk HPV (human papillomavirus) test positive 02/08/2016     Diabetes (H)      Essential hypertension, benign 10/7/2015     Hyperlipidemia LDL goal < 100 10/7/2015     Left hemiplegia (H) 10/7/2015       No past surgical history on file.    FAMILY HISTORY: Denies family history of urologic cancer.     SOCIAL HISTORY:    reports that she has never smoked. She has never used smokeless tobacco.    Current Outpatient Prescriptions   Medication Sig Dispense Refill     amLODIPine (NORVASC) 10 MG tablet TAKE 1 TABLET BY MOUTH  DAILY 90 tablet 1     ASPIRIN NOT PRESCRIBED, INTENTIONAL, 1 each daily Antiplatelet medication not prescribed intentionally due to on Plavix 0 each 0     atorvastatin (LIPITOR) 20 MG tablet Take 1 tablet (20 mg) by mouth daily 90 tablet 3     blood glucose monitoring (NO  "BRAND SPECIFIED) test strip Use to test blood sugars 2 times daily or as directed 180 each 0     ciprofloxacin (CIPRO) 250 MG tablet Take 1 tablet (250 mg) by mouth 2 times daily 6 tablet 0     clopidogrel (PLAVIX) 75 MG tablet Take 1 tablet (75 mg) by mouth daily 90 tablet 3     glimepiride (AMARYL) 2 MG tablet TAKE 1 TABLET BY MOUTH  EVERY MORNING (BEFORE  BREAKFAST) 90 tablet 1     JANUVIA 50 MG tablet TAKE 1 TABLET BY MOUTH  DAILY WITH BREAKFAST 90 tablet 1     losartan (COZAAR) 50 MG tablet Take 1 tablet (50 mg) by mouth daily 90 tablet 3     metFORMIN (GLUCOPHAGE-XR) 500 MG 24 hr tablet TAKE 1 TABLET BY MOUTH  DAILY 90 tablet 1     order for DME Accu-check glucometer or per insurance coverage 1 Units 0     order for DME Accu-check glucometer test lancets, or per insurance coverage    Check QAM and  Units 3     order for DME Accu-check glucometer test strips or per insurance coverage    Check QAM and  Units 3     order for DME Equipment being ordered: left AFO brace 1 Units 0     order for DME Equipment being ordered: knee high compression stockings, 30-40mmHg 2 Units 0     order for DME Seated walker with basket 1 Device 0       ALLERGIES: Review of patient's allergies indicates no known allergies.      REVIEW OF SYSTEMS: 14-pt ROS was negative other than that noted in the HPI.    GENERAL PHYSICAL EXAM:   /68 (BP Location: Right arm, Patient Position: Sitting, Cuff Size: Adult Regular)  Pulse 104  Ht 1.6 m (5' 3\")  Wt 94.8 kg (209 lb)  SpO2 97%  BMI 37.02 kg/m2  GENERAL: Well groomed, well developed, well nourished female in NAD.  HEAD: Normocephalic. No masses, lesions, tenderness or abnormalities  NECK: Neck supple. No adenopathy. Thyroid symmetric, normal size  ENT: EOMI  RESPIRATORY: CTAB. No increased respiratory effort.   GI: Soft, NT  MS: Full ROM in extremities, gait normal.  SKIN: Warm to touch, dry.    EXT: No LE edema.  NEURO: Alert and oriented x 3.  PSYCH: Normal mood and " affect, pleasant and agreeable during interview and exam.    PVR: Residual urine by ultrasound was 0 ml.   Urine - trace blood    ASSESSMENT:   Recurrent UTIs, DM2, CKD, hx CVA x 2    PLAN:   UA/micro  Mycoplasma, Ureaplasma  Renal u/s  Cysto   Bladder irritant list provided, bowel regimen.  Hydrate  - Lifestyle and hygiene modifications were reviewed today in clinic, including wiping front to back, wearing cotton breathable underwear, voiding before and after intercourse to flush the urethra, minimizing baths and opting for showers, and appropriate perineal hygiene. Push fluids to keep the urine dilute    Krystal López PA-C  [facility]    30 min spent with the patient in a face to face manner, >50% of that time spent on counseling and coordination of care of UTIs.

## 2018-05-06 LAB
BACTERIA SPEC CULT: ABNORMAL
Lab: ABNORMAL
SPECIMEN SOURCE: ABNORMAL

## 2018-05-07 ENCOUNTER — TELEPHONE (OUTPATIENT)
Dept: UROLOGY | Facility: CLINIC | Age: 48
End: 2018-05-07

## 2018-05-07 DIAGNOSIS — N39.0 URINARY TRACT INFECTION: Primary | ICD-10-CM

## 2018-05-07 RX ORDER — DOXYCYCLINE 100 MG/1
100 CAPSULE ORAL 2 TIMES DAILY
Qty: 14 CAPSULE | Refills: 0 | Status: SHIPPED | OUTPATIENT
Start: 2018-05-07 | End: 2018-09-14

## 2018-05-07 NOTE — TELEPHONE ENCOUNTER
Called pharmacy gave an order for an additional 3 days of Doxycyline for total of 10 days.Julienne Moralez LPN

## 2018-05-11 LAB
BACTERIA SPEC CULT: NORMAL
Lab: NORMAL
SPECIMEN SOURCE: NORMAL

## 2018-06-06 DIAGNOSIS — Z87.440 PERSONAL HISTORY OF URINARY TRACT INFECTION: Primary | ICD-10-CM

## 2018-06-08 ENCOUNTER — OFFICE VISIT (OUTPATIENT)
Dept: UROLOGY | Facility: CLINIC | Age: 48
End: 2018-06-08
Payer: COMMERCIAL

## 2018-06-08 ENCOUNTER — HOSPITAL ENCOUNTER (OUTPATIENT)
Dept: ULTRASOUND IMAGING | Facility: CLINIC | Age: 48
Discharge: HOME | End: 2018-06-08
Attending: PHYSICIAN ASSISTANT | Admitting: PHYSICIAN ASSISTANT
Payer: COMMERCIAL

## 2018-06-08 VITALS
BODY MASS INDEX: 37.03 KG/M2 | DIASTOLIC BLOOD PRESSURE: 78 MMHG | SYSTOLIC BLOOD PRESSURE: 129 MMHG | HEIGHT: 63 IN | WEIGHT: 209 LBS

## 2018-06-08 DIAGNOSIS — Z87.440 PERSONAL HISTORY OF URINARY TRACT INFECTION: ICD-10-CM

## 2018-06-08 DIAGNOSIS — N39.46 MIXED INCONTINENCE: Primary | ICD-10-CM

## 2018-06-08 LAB
ALBUMIN UR-MCNC: 100 MG/DL
APPEARANCE UR: CLEAR
BILIRUB UR QL STRIP: NEGATIVE
COLOR UR AUTO: YELLOW
GLUCOSE UR STRIP-MCNC: NEGATIVE MG/DL
HGB UR QL STRIP: ABNORMAL
KETONES UR STRIP-MCNC: NEGATIVE MG/DL
LEUKOCYTE ESTERASE UR QL STRIP: NEGATIVE
NITRATE UR QL: NEGATIVE
PH UR STRIP: 6 PH (ref 5–7)
SOURCE: ABNORMAL
SP GR UR STRIP: 1.01 (ref 1–1.03)
UROBILINOGEN UR STRIP-ACNC: 0.2 EU/DL (ref 0.2–1)

## 2018-06-08 PROCEDURE — 76770 US EXAM ABDO BACK WALL COMP: CPT

## 2018-06-08 PROCEDURE — 81003 URINALYSIS AUTO W/O SCOPE: CPT | Performed by: UROLOGY

## 2018-06-08 PROCEDURE — 99214 OFFICE O/P EST MOD 30 MIN: CPT | Mod: 25 | Performed by: UROLOGY

## 2018-06-08 PROCEDURE — 52000 CYSTOURETHROSCOPY: CPT | Performed by: UROLOGY

## 2018-06-08 RX ORDER — CIPROFLOXACIN 500 MG/1
500 TABLET, FILM COATED ORAL ONCE
Qty: 1 TABLET | Refills: 0 | Status: SHIPPED | OUTPATIENT
Start: 2018-06-08 | End: 2018-06-08

## 2018-06-08 RX ORDER — TOLTERODINE 4 MG/1
4 CAPSULE, EXTENDED RELEASE ORAL DAILY
Qty: 30 CAPSULE | Refills: 11 | Status: SHIPPED | OUTPATIENT
Start: 2018-06-08

## 2018-06-08 RX ORDER — SULFAMETHOXAZOLE/TRIMETHOPRIM 800-160 MG
TABLET ORAL
Refills: 0 | COMMUNITY
Start: 2018-04-24 | End: 2018-09-14

## 2018-06-08 ASSESSMENT — PAIN SCALES - GENERAL: PAINLEVEL: NO PAIN (0)

## 2018-06-08 NOTE — MR AVS SNAPSHOT
"              After Visit Summary   6/8/2018    Kelly Polk    MRN: [id number]           Patient Information     Date Of Birth          1970        Visit Information        Provider Department      6/8/2018 10:00 AM Jimmy Kennedy MD; [facility]        Today's Diagnoses     Mixed incontinence    -  1    Personal history of urinary tract infection          Care Instructions         AFTER YOUR CYSTOSCOPY         You have just completed a cystoscopy, or \"cysto\", which allowed your physician to learn more about your bladder (or to remove a stent placed after surgery). We suggest that you continue to avoid caffeine, fruit juice, and alcohol for the next 24 hours, however, you are encouraged to return to your normal activities.       A few things that are considered normal after your cystoscopy:    * small amount of bleeding (or spotting) that clears within the next 24 hours    * slight burning sensation with urination    * sensation to of needing to avoid more frequently    * the feeling of \"air\" in your urine    * mild discomfort that is relieved with Tylonol        Please contact our office promptly if you:    * develop a fever above 101 degrees    * are unable to urinate    * develop bright red blood that does not stop    * severe pain or swelling        And of course, please contact our office with any concerns or questions [phone]                Follow-ups after your visit        Your next 10 appointments already scheduled     Jul 18, 2018 12:15 PM CDT   Return Visit with Jaja Hernandez MD   [facility] ([facility])    [address]  [address]  [facility] [id number] [phone]              Who to contact     If you have questions or need follow up information about today's clinic visit or your schedule please contact [facility] directly at " "[phone].  Normal or non-critical lab and imaging results will be communicated to you by MyChart, letter or phone within 4 business days after the clinic has received the results. If you do not hear from us within 7 days, please contact the clinic through MyChart or phone. If you have a critical or abnormal lab result, we will notify you by phone as soon as possible.  Submit refill requests through [facility] [employer] or call your pharmacy and they will forward the refill request to us. Please allow 3 business days for your refill to be completed.          Additional Information About Your Visit        Care EveryWhere ID     This is your Care EveryWhere ID. This could be used by other organizations to access your [place] medical records  [id number]        Your Vitals Were     Height BMI (Body Mass Index)                1.6 m (5' 3\") 37.02 kg/m2           Blood Pressure from Last 3 Encounters:   06/08/18 129/78   05/04/18 110/68   04/17/18 120/74    Weight from Last 3 Encounters:   06/08/18 94.8 kg (209 lb)   05/04/18 94.8 kg (209 lb)   04/17/18 95 kg (209 lb 7 oz)              We Performed the Following     CYSTOURETHROSCOPY (34790)     UA without Microscopic          Today's Medication Changes          These changes are accurate as of 6/8/18 11:10 AM.  If you have any questions, ask your nurse or doctor.               Start taking these medicines.        Dose/Directions    tolterodine 4 MG 24 hr capsule   Commonly known as:  DETROL LA   Used for:  Mixed incontinence   Started by:  Jimmy Kennedy MD        Dose:  4 mg   Take 1 capsule (4 mg) by mouth daily   Quantity:  30 capsule   Refills:  11         These medicines have changed or have updated prescriptions.        Dose/Directions    * ciprofloxacin 250 MG tablet   Commonly known as:  CIPRO   This may have changed:  Another medication with the same name was added. Make sure you understand how and when to take each.   Used for:  Acute cystitis without hematuria "   Changed by:  Jimmy Kennedy MD        Dose:  250 mg   Take 1 tablet (250 mg) by mouth 2 times daily   Quantity:  6 tablet   Refills:  0       * ciprofloxacin 500 MG tablet   Commonly known as:  CIPRO   This may have changed:  You were already taking a medication with the same name, and this prescription was added. Make sure you understand how and when to take each.   Used for:  Personal history of urinary tract infection   Changed by:  Jimmy Kennedy MD        Dose:  500 mg   Take 1 tablet (500 mg) by mouth once for 1 dose   Quantity:  1 tablet   Refills:  0       * Notice:  This list has 2 medication(s) that are the same as other medications prescribed for you. Read the directions carefully, and ask your doctor or other care provider to review them with you.         Where to get your medicines      These medications were sent to [facility] [place], [place] - [address]  [address], [place] [address]     Phone:  [phone]     ciprofloxacin 500 MG tablet         These medications were sent to [facility] - [address], [facility] [address]     Phone:  [phone]     tolterodine 4 MG 24 hr capsule                Primary Care Provider Office Phone # Fax #    Willis Chen [phone]       [facility] [place] [address]        Equal Access to Services     [facility] AH: Hadii nick ku hadasho Soomaali, waaxda luqadaha, qaybta kaalmada ashleigh, eve hsu. So [facility] [phone].    ATENCIÓN: Si habla español, tiene a rader disposición servicios gratuitos de asistencia lingüística. James al [phone].    We comply with applicable federal civil rights laws and [place] laws. We do not discriminate on the basis of race, color, national origin, age, disability, sex, sexual orientation, or gender identity.            Thank you!     Thank you for  choosing [facility] [place]  for your care. Our goal is always to provide you with excellent care. Hearing back from our patients is one way we can continue to improve our services. Please take a few minutes to complete the written survey that you may receive in the mail after your visit with us. Thank you!             Your Updated Medication List - Protect others around you: Learn how to safely use, store and throw away your medicines at www.disposemymeds.org.          This list is accurate as of 6/8/18 11:10 AM.  Always use your most recent med list.                   Brand Name Dispense Instructions for use Diagnosis    amLODIPine 10 MG tablet    NORVASC    90 tablet    TAKE 1 TABLET BY MOUTH  DAILY    Essential hypertension with goal blood pressure less than 140/90       ASPIRIN NOT PRESCRIBED    INTENTIONAL    0 each    1 each daily Antiplatelet medication not prescribed intentionally due to on Plavix    Type 2 diabetes mellitus with hyperglycemia (H)       atorvastatin 20 MG tablet    LIPITOR    90 tablet    Take 1 tablet (20 mg) by mouth daily    Hyperlipidemia with target LDL less than 100       blood glucose monitoring test strip    no brand specified    180 each    Use to test blood sugars 2 times daily or as directed    Type 2 diabetes mellitus with diabetic neuropathy, without long-term current use of insulin (H)       * ciprofloxacin 250 MG tablet    CIPRO    6 tablet    Take 1 tablet (250 mg) by mouth 2 times daily    Acute cystitis without hematuria       * ciprofloxacin 500 MG tablet    CIPRO    1 tablet    Take 1 tablet (500 mg) by mouth once for 1 dose    Personal history of urinary tract infection       clopidogrel 75 MG tablet    PLAVIX    90 tablet    Take 1 tablet (75 mg) by mouth daily    Cerebrovascular accident (CVA), unspecified mechanism (H)       doxycycline 100 MG capsule    VIBRAMYCIN    14 capsule    Take 1 capsule (100 mg) by mouth 2 times daily     Urinary tract infection       glimepiride 2 MG tablet    AMARYL    90 tablet    TAKE 1 TABLET BY MOUTH  EVERY MORNING (BEFORE  BREAKFAST)    Cerebrovascular accident (CVA), unspecified mechanism (H)       JANUVIA 50 MG tablet   Generic drug:  sitagliptin     90 tablet    TAKE 1 TABLET BY MOUTH  DAILY WITH BREAKFAST    Type 2 diabetes mellitus with diabetic neuropathy, without long-term current use of insulin (H)       losartan 50 MG tablet    COZAAR    90 tablet    Take 1 tablet (50 mg) by mouth daily    Essential hypertension with goal blood pressure less than 140/90       metFORMIN 500 MG 24 hr tablet    GLUCOPHAGE-XR    90 tablet    TAKE 1 TABLET BY MOUTH  DAILY    Type 2 diabetes mellitus with diabetic neuropathy, without long-term current use of insulin (H)       order for DME     1 Units    Accu-check glucometer or per insurance coverage    Type 2 diabetes mellitus with diabetic neuropathy (H)       order for DME     180 Units    Accu-check glucometer test lancets, or per insurance coverage  Check QAM and QPM    Type 2 diabetes mellitus with diabetic neuropathy (H)       * order for DME     180 Units    Accu-check glucometer test strips or per insurance coverage  Check QAM and QPM    Type 2 diabetes mellitus with diabetic neuropathy (H)       * order for DME     1 Units    Equipment being ordered: left AFO brace    Left foot drop       order for DME     2 Units    Equipment being ordered: knee high compression stockings, 30-40mmHg    Edema, unspecified type       * order for DME     1 Device    Seated walker with basket    Left hemiplegia (H)       sulfamethoxazole-trimethoprim 800-160 MG per tablet    BACTRIM DS/SEPTRA DS     TAKE 1 TABLET BY MOUTH TWICE A DAY FOR 5 DAYS        tolterodine 4 MG 24 hr capsule    DETROL LA    30 capsule    Take 1 capsule (4 mg) by mouth daily    Mixed incontinence       * Notice:  This list has 5 medication(s) that are the same as other medications prescribed for you. Read the  directions carefully, and ask your doctor or other care provider to review them with you.

## 2018-06-08 NOTE — PROGRESS NOTES
Kelly Polk is a pleasant 47-year-old black female who has had recurrent Klebsiella urinary tract infections and was recently treated for Ureaplasma. She's had 2 strokes and since her stroke she's had both stress incontinence and urge incontinence.  Her renal ultrasound shows no hydronephrosis and no stones. Her urinalysis is normal today  Other past medical history: HPV, diabetes, hypertension, hyperlipidemia, left hemiplegia, nonsmoker  Medications: Norvasc, Lipitor, Plavix, glimepiride, Januvia, losartan, metformin  Allergies: None  Exam: Alert and oriented, normal appearance, normal vital signs. Normal external genitalia and urethral meatus  Flexible cystoscopy-normal urethral mucosa, no diverticulum, loss of bladder neck angle. Cobblestoning of mucosa with no tumors or raised erythema. Normal ureteral orifices, some squamous metaplasia of trigone, no diverticulum or fistula  Assessment: Recurrent urinary tract infections secondary to bacterial colonization, chronic cystitis, mixed urinary incontinence  Plan: Cipro 500 mg ×1 today. Trial of tolterodine 4 mg daily, see Dr. Hernandez for follow-up of mixed urinary incontinence

## 2018-06-08 NOTE — NURSING NOTE
Invasive Procedure Safety Checklist:    Procedure:     Action: Complete sections and checkboxes as appropriate.    Pre-procedure:  1. Patient ID Verified with 2 identifiers (Serina and  or MRN) : YES    2. Procedure and site verified with patient/designee (when able) : YES    3. Accurate consent documentation in medical record : YES    4. H&P (or appropriate assessment) documented in medical record : YES  H&P must be up to 30 days prior to procedure an updated within 24 hours of                 Procedure as applicable.     5. Relevant diagnostic and radiology test results appropriately labeled and displayed as applicable : YES    6. Blood products, implants, devices, and/or special equipment available for the procedure as applicable : YES    7. Procedure site(s) marked with provider initials [Exclusions: N/A] : NO    8. Marking not required. Reason : Yes  Procedure does not require site marking    Time Out:     Time-Out performed immediately prior to starting procedure, including verbal and active participation of all team members addressing: YES    1. Correct patient identity.  2. Confirmed that the correct side and site are marked.  3. An accurate procedure to be done.  4. Agreement on the procedure to be done.  5. Correct patient position.  6. Relevant images and results are properly labeled and appropriately displayed.  7. The need to administer antibiotics or fluids for irrigation purposes during the procedure as applicable.  8. Safety precautions based on patient history or medication use.    During Procedure: Verification of correct person, site, and procedure occurs any time the responsibility for care of the patient is transferred to another member of the care team.    The following medication was given:     MEDICATION: Uro-jet  ROUTE: Urethral meatus   SITE: Urethra   DOSE: 20 mL  LOT #: [id number]  :  International medication systems, limited   EXPIRATION DATE:    NDC#: [address]-4

## 2018-06-08 NOTE — PATIENT INSTRUCTIONS
"     AFTER YOUR CYSTOSCOPY         You have just completed a cystoscopy, or \"cysto\", which allowed your physician to learn more about your bladder (or to remove a stent placed after surgery). We suggest that you continue to avoid caffeine, fruit juice, and alcohol for the next 24 hours, however, you are encouraged to return to your normal activities.       A few things that are considered normal after your cystoscopy:    * small amount of bleeding (or spotting) that clears within the next 24 hours    * slight burning sensation with urination    * sensation to of needing to avoid more frequently    * the feeling of \"air\" in your urine    * mild discomfort that is relieved with Tylonol        Please contact our office promptly if you:    * develop a fever above 101 degrees    * are unable to urinate    * develop bright red blood that does not stop    * severe pain or swelling        And of course, please contact our office with any concerns or questions [phone]        "

## 2018-08-17 ENCOUNTER — TELEPHONE (OUTPATIENT)
Dept: MAMMOGRAPHY | Facility: CLINIC | Age: 48
End: 2018-08-17

## 2018-08-17 NOTE — TELEPHONE ENCOUNTER
8/17/2018    Attempt 1    Contacted patient in regards to scheduling VIP mammogram  Message on voicemail     Comments:       Outreach   RAJ

## 2018-08-21 ENCOUNTER — TRANSFERRED RECORDS (OUTPATIENT)
Dept: HEALTH INFORMATION MANAGEMENT | Facility: CLINIC | Age: 48
End: 2018-08-21

## 2018-08-27 DIAGNOSIS — E11.40 TYPE 2 DIABETES MELLITUS WITH DIABETIC NEUROPATHY, WITHOUT LONG-TERM CURRENT USE OF INSULIN (H): ICD-10-CM

## 2018-08-27 NOTE — TELEPHONE ENCOUNTER
"Requested Prescriptions   Pending Prescriptions Disp Refills     metFORMIN (GLUCOPHAGE-XR) 500 MG 24 hr tablet [Pharmacy Med Name: METFORMIN ER 500MG TABLET]    Last Written Prescription Date:  3/28/2018  Last Fill Quantity: 90,  # refills: 1   Last office visit: 4/17/2018 with prescribing provider:  Willis Chen     Future Office Visit:     90 tablet      Sig: TAKE 1 TABLET BY MOUTH  DAILY    Biguanide Agents Failed    8/27/2018  5:11 AM       Failed - Patient has documented LDL within the past 12 mos.    Recent Labs   Lab Test  06/29/17   0816   LDL  124*            Passed - Blood pressure less than 140/90 in past 6 months    BP Readings from Last 3 Encounters:   06/08/18 129/78   05/04/18 110/68   04/17/18 120/74                Passed - Patient has had a Microalbumin in the past 15 mos.    Recent Labs   Lab Test  06/29/17 0816   MICROL  800   UMALCR  828.16*            Passed - Patient is age 10 or older       Passed - Patient has documented A1c within the specified period of time.    If HgbA1C is 8 or greater, it needs to be on file within the past 3 months.  If less than 8, must be on file within the past 6 months.     Recent Labs   Lab Test  04/17/18   1108   A1C  6.8*            Passed - Patient's CR is NOT>1.4 OR Patient's EGFR is NOT<45 within past 12 mos.    Recent Labs   Lab Test  06/29/17 0816   GFRESTIMATED  43*   GFRESTBLACK  52*       Recent Labs   Lab Test  06/29/17 0816   CR  1.33*            Passed - Patient does NOT have a diagnosis of CHF.       Passed - Patient is not pregnant       Passed - Patient has not had a positive pregnancy test within the past 12 mos.        Passed - Recent (6 mo) or future (30 days) visit within the authorizing provider's specialty    Patient had office visit in the last 6 months or has a visit in the next 30 days with authorizing provider or within the authorizing provider's specialty.  See \"Patient Info\" tab in inbasket, or \"Choose Columns\" in Meds & " "Orders section of the refill encounter.            JANUVIA 50 MG tablet [Pharmacy Med Name: JANUVIA  50MG  TAB]    Last Written Prescription Date:  3/28/2018  Last Fill Quantity: 90,  # refills: 1   Last office visit: 4/17/2018 with prescribing provider:  Willis Chen     Future Office Visit:     90 tablet      Sig: TAKE 1 TABLET BY MOUTH  DAILY WITH BREAKFAST    DPP4 Inhibitors Protocol Failed    8/27/2018  5:11 AM       Failed - LDL on file in past 12 months    Recent Labs   Lab Test  06/29/17   0816   LDL  124*            Failed - Normal serum creatinine in past 12 months    Recent Labs   Lab Test  06/29/17   0816   02/18/16   1543   CR  1.33*   < >   --    CREAT   --    --   1.2*    < > = values in this interval not displayed.            Passed - Blood pressure less than 140/90 in past 6 months    BP Readings from Last 3 Encounters:   06/08/18 129/78   05/04/18 110/68   04/17/18 120/74                Passed - Microalbumin on file in past 12 months    Recent Labs   Lab Test  06/29/17   0816   MICROL  800   UMALCR  828.16*            Passed - HgbA1C in past 3 or 6 months    If HgbA1C is 8 or greater, it needs to be on file within the past 3 months.  If less than 8, must be on file within the past 6 months.     Recent Labs   Lab Test  04/17/18   1108   A1C  6.8*            Passed - Patient is age 18 or older       Passed - No active pregnancy on record       Passed - No positive pregnancy test in past 12 months       Passed - Recent (6 mo) or future (30 days) visit within the authorizing provider's specialty    Patient had office visit in the last 6 months or has a visit in the next 30 days with authorizing provider.  See \"Patient Info\" tab in inbasket, or \"Choose Columns\" in Meds & Orders section of the refill encounter.              "

## 2018-08-29 DIAGNOSIS — E11.40 TYPE 2 DIABETES MELLITUS WITH DIABETIC NEUROPATHY, WITHOUT LONG-TERM CURRENT USE OF INSULIN (H): ICD-10-CM

## 2018-08-30 RX ORDER — METFORMIN HCL 500 MG
TABLET, EXTENDED RELEASE 24 HR ORAL
Qty: 90 TABLET | Refills: 0 | Status: SHIPPED | OUTPATIENT
Start: 2018-08-30 | End: 2018-09-14

## 2018-08-30 RX ORDER — SITAGLIPTIN 50 MG/1
TABLET, FILM COATED ORAL
Qty: 90 TABLET | Refills: 0 | Status: SHIPPED | OUTPATIENT
Start: 2018-08-30 | End: 2018-09-14

## 2018-08-30 NOTE — TELEPHONE ENCOUNTER
Routing refill request to provider for review/approval because:  Labs not current:  FLP  Sabra given x1 and patient did not follow up, please advise    Anum MIDDLETON RN, BSN, PHN  Ira Raines RN

## 2018-08-30 NOTE — TELEPHONE ENCOUNTER
Ok for refill.  Needs follow-up visit this Fall with fasting labwork  Please clarify Primary--looks like she has seen Dr Canas past few visits

## 2018-09-05 ENCOUNTER — TELEPHONE (OUTPATIENT)
Dept: PEDIATRICS | Facility: CLINIC | Age: 48
End: 2018-09-05

## 2018-09-06 NOTE — TELEPHONE ENCOUNTER
Called patient again and helped her set up an appointment for next week with Cadence Coreas. Patient wanted to get in next week and no openings with Dr Chen or Dr Hawk. Patient is open to seeing any provider. She still wants Dr Chen as her PCP.

## 2018-09-14 ENCOUNTER — OFFICE VISIT (OUTPATIENT)
Dept: PEDIATRICS | Facility: CLINIC | Age: 48
End: 2018-09-14
Payer: COMMERCIAL

## 2018-09-14 VITALS
BODY MASS INDEX: 35.7 KG/M2 | WEIGHT: 201.5 LBS | HEART RATE: 117 BPM | DIASTOLIC BLOOD PRESSURE: 64 MMHG | TEMPERATURE: 99.2 F | HEIGHT: 63 IN | OXYGEN SATURATION: 97 % | SYSTOLIC BLOOD PRESSURE: 112 MMHG

## 2018-09-14 DIAGNOSIS — I10 ESSENTIAL HYPERTENSION WITH GOAL BLOOD PRESSURE LESS THAN 140/90: ICD-10-CM

## 2018-09-14 DIAGNOSIS — E66.01 MORBID OBESITY (H): ICD-10-CM

## 2018-09-14 DIAGNOSIS — E11.40 TYPE 2 DIABETES MELLITUS WITH DIABETIC NEUROPATHY, WITHOUT LONG-TERM CURRENT USE OF INSULIN (H): Primary | ICD-10-CM

## 2018-09-14 DIAGNOSIS — R00.0 TACHYCARDIA: ICD-10-CM

## 2018-09-14 LAB
ALBUMIN UR-MCNC: 100 MG/DL
APPEARANCE UR: CLEAR
BACTERIA #/AREA URNS HPF: ABNORMAL /HPF
BASOPHILS # BLD AUTO: 0.1 10E9/L (ref 0–0.2)
BASOPHILS NFR BLD AUTO: 0.5 %
BILIRUB UR QL STRIP: NEGATIVE
COLOR UR AUTO: YELLOW
DIFFERENTIAL METHOD BLD: ABNORMAL
EOSINOPHIL # BLD AUTO: 0.2 10E9/L (ref 0–0.7)
EOSINOPHIL NFR BLD AUTO: 1.4 %
ERYTHROCYTE [DISTWIDTH] IN BLOOD BY AUTOMATED COUNT: 13.6 % (ref 10–15)
GLUCOSE UR STRIP-MCNC: NEGATIVE MG/DL
HBA1C MFR BLD: 6.7 % (ref 0–5.6)
HCT VFR BLD AUTO: 38.6 % (ref 35–47)
HGB BLD-MCNC: 12.7 G/DL (ref 11.7–15.7)
HGB UR QL STRIP: ABNORMAL
KETONES UR STRIP-MCNC: NEGATIVE MG/DL
LEUKOCYTE ESTERASE UR QL STRIP: NEGATIVE
LYMPHOCYTES # BLD AUTO: 3.2 10E9/L (ref 0.8–5.3)
LYMPHOCYTES NFR BLD AUTO: 25.6 %
MCH RBC QN AUTO: 26.1 PG (ref 26.5–33)
MCHC RBC AUTO-ENTMCNC: 32.9 G/DL (ref 31.5–36.5)
MCV RBC AUTO: 79 FL (ref 78–100)
MONOCYTES # BLD AUTO: 1 10E9/L (ref 0–1.3)
MONOCYTES NFR BLD AUTO: 7.8 %
NEUTROPHILS # BLD AUTO: 8.1 10E9/L (ref 1.6–8.3)
NEUTROPHILS NFR BLD AUTO: 64.7 %
NITRATE UR QL: NEGATIVE
NON-SQ EPI CELLS #/AREA URNS LPF: ABNORMAL /LPF
PH UR STRIP: 5.5 PH (ref 5–7)
PLATELET # BLD AUTO: 350 10E9/L (ref 150–450)
RBC # BLD AUTO: 4.86 10E12/L (ref 3.8–5.2)
RBC #/AREA URNS AUTO: ABNORMAL /HPF
SOURCE: ABNORMAL
SP GR UR STRIP: 1.01 (ref 1–1.03)
UROBILINOGEN UR STRIP-ACNC: 0.2 EU/DL (ref 0.2–1)
WBC # BLD AUTO: 12.5 10E9/L (ref 4–11)
WBC #/AREA URNS AUTO: ABNORMAL /HPF

## 2018-09-14 PROCEDURE — 84443 ASSAY THYROID STIM HORMONE: CPT | Performed by: NURSE PRACTITIONER

## 2018-09-14 PROCEDURE — 80061 LIPID PANEL: CPT | Performed by: NURSE PRACTITIONER

## 2018-09-14 PROCEDURE — 82043 UR ALBUMIN QUANTITATIVE: CPT | Performed by: NURSE PRACTITIONER

## 2018-09-14 PROCEDURE — 81001 URINALYSIS AUTO W/SCOPE: CPT | Performed by: NURSE PRACTITIONER

## 2018-09-14 PROCEDURE — 80048 BASIC METABOLIC PNL TOTAL CA: CPT | Performed by: NURSE PRACTITIONER

## 2018-09-14 PROCEDURE — 85025 COMPLETE CBC W/AUTO DIFF WBC: CPT | Performed by: NURSE PRACTITIONER

## 2018-09-14 PROCEDURE — 99214 OFFICE O/P EST MOD 30 MIN: CPT | Performed by: NURSE PRACTITIONER

## 2018-09-14 PROCEDURE — 83036 HEMOGLOBIN GLYCOSYLATED A1C: CPT | Performed by: NURSE PRACTITIONER

## 2018-09-14 PROCEDURE — 36415 COLL VENOUS BLD VENIPUNCTURE: CPT | Performed by: NURSE PRACTITIONER

## 2018-09-14 RX ORDER — AMLODIPINE BESYLATE 10 MG/1
TABLET ORAL
Qty: 90 TABLET | Refills: 1 | Status: SHIPPED | OUTPATIENT
Start: 2018-09-14 | End: 2019-05-08

## 2018-09-14 RX ORDER — CLOPIDOGREL BISULFATE 75 MG/1
75 TABLET ORAL DAILY
Qty: 90 TABLET | Refills: 3 | Status: CANCELLED | OUTPATIENT
Start: 2018-09-14

## 2018-09-14 RX ORDER — GLIMEPIRIDE 2 MG/1
TABLET ORAL
Qty: 90 TABLET | Refills: 0 | Status: SHIPPED | OUTPATIENT
Start: 2018-09-14 | End: 2018-11-02

## 2018-09-14 RX ORDER — METFORMIN HCL 500 MG
500 TABLET, EXTENDED RELEASE 24 HR ORAL
Qty: 90 TABLET | Refills: 0 | Status: SHIPPED | OUTPATIENT
Start: 2018-09-14 | End: 2018-11-02

## 2018-09-14 NOTE — PROGRESS NOTES
"  SUBJECTIVE:   Kelly Polk is a 47 year old female who presents to clinic today for the following health issues:    Diabetes Follow-up  Ran out of glimeperide, needs test strips  Patient declines vaccines today.      Patient is checking blood sugars: 3-4 times a week mornings average 120s, mid afternoon average 140s-150s    Diabetic concerns: None     Symptoms of hypoglycemia (low blood sugar): none     Paresthesias (numbness or burning in feet) or sores: No     Date of last diabetic eye exam: states had June 2018 - [facility]    Diabetes Management Resources    Hyperlipidemia Follow-Up      Rate your low fat/cholesterol diet?: fair    Taking statin?  Yes, no muscle aches from statin    Other lipid medications/supplements?:  none    Hypertension Follow-up      Outpatient blood pressures being checked occasionally    Low Salt Diet: no added salt    BP Readings from Last 2 Encounters:   06/08/18 129/78   05/04/18 110/68     Hemoglobin A1C (%)   Date Value   04/17/2018 6.8 (H)   06/29/2017 5.7     LDL Cholesterol Calculated (mg/dL)   Date Value   06/29/2017 124 (H)   08/12/2016 93       Amount of exercise or physical activity: 2-3 days/week for an average of 15-30 minutes - states she has not done much this month    Problems taking medications regularly: No    Medication side effects: none    Diet: low salt, low fat/cholesterol and diabetic    ROS: const/endo/cv/resp/heent/gi/gu/gyn otherwise negative     OBJECTIVE:  /64 (BP Location: Right arm, Cuff Size: Adult Large)  Pulse 117  Temp 99.2  F (37.3  C) (Tympanic)  Ht 5' 3\" (1.6 m)  Wt 201 lb 8 oz (91.4 kg)  SpO2 97%  BMI 35.69 kg/m2  CONSTITUTIONAL: Alert, well-nourished, well-groomed, NAD  RESP: Lungs CTA. No wheeze, rhonchi, rales.  CV: HRRR S1 S2 No MRG. No peripheral edema  HEENT: Eyes: Conjunctiva pink and moist. Ears: Ear canals unremarkable. TMs pearly gray bilaterally. Bony landmarks and light reflexes intact. No erythema. Nose: " Turbinates pink and moist. Throat: OP pink and moist. No tonsillar enlargement or exudates. No postnasal drip.  Neck: No lymphadenopathy or masses. Thyroid smooth, non-tender, and non-enlarged.  GI: Abdomen flat. BS x 4. No TTP. No HSM or masses. No CVAT  DERM: NO rash.     ASSESSMENT/PLAN:  (E11.40) Type 2 diabetes mellitus with diabetic neuropathy, without long-term current use of insulin (H)  (primary encounter diagnosis)  Comment: Well controlled at last visit. Has been intentionally losing weight and is compliant with meds. Didn't come in for pre-visit labs.   Plan: Lipid panel reflex to direct LDL Fasting,         Creatinine, Albumin Random Urine Quantitative         with Creat Ratio, glimepiride (AMARYL) 2 MG         tablet, order for DME, blood glucose monitoring        (NO BRAND SPECIFIED) test strip, sitagliptin         (JANUVIA) 50 MG tablet, metFORMIN         (GLUCOPHAGE-XR) 500 MG 24 hr tablet, Lipid         panel reflex to direct LDL Fasting, Hemoglobin         A1c, Basic metabolic panel, Albumin Random         Urine Quantitative with Creat Ratio          -Continue current meds  -Labs today  -I'll contact you with labs and any med changes as well as f/u plan.       (I10) Essential hypertension with goal blood pressure less than 140/90  Comment: Well controlled.   Plan: amLODIPine (NORVASC) 10 MG tablet          -Labs today.     (E66.01) Morbid obesity (H)  Comment: Working on weight loss.       (R00.0) Tachycardia  Comment: Patient with tachycardia and low grade fever. Has frequent UTIs but no current signs of UTI or any other infection whatsoever. Has no chest pain, palpitations, shortness of breath, anxiety, or pain. Her HR was slightly elevated at last visit as well.   Plan: CBC with platelets differential, *UA reflex to         Microscopic and Culture ([place] and [place]         Clinics (except [place] and [place])          -Will check for signs of infection today  (UA, CBC)  -If all normal  will have her RTC next week for vital signs check  -Discussed reasons to seek care urgently.             Cadence Coreas, CINTHIA-ELIZ.

## 2018-09-14 NOTE — MR AVS SNAPSHOT
"              After Visit Summary   9/14/2018    Kelly Polk    MRN: [id number]           Patient Information     Date Of Birth          1970        Visit Information        Provider Department      9/14/2018 11:20 AM Cadence Coreas APRN CNP [facility]        Today's Diagnoses     Type 2 diabetes mellitus with diabetic neuropathy, without long-term current use of insulin (H)    -  1    Essential hypertension with goal blood pressure less than 140/90        Morbid obesity (H)        Tachycardia          Care Instructions    -Please go to lab and I'll call you with the results and plan.          Follow-ups after your visit        Who to contact     If you have questions or need follow up information about today's clinic visit or your schedule please contact [facility] directly at [phone].  Normal or non-critical lab and imaging results will be communicated to you by MyChart, letter or phone within 4 business days after the clinic has received the results. If you do not hear from us within 7 days, please contact the clinic through MyChart or phone. If you have a critical or abnormal lab result, we will notify you by phone as soon as possible.  Submit refill requests through Well[employer] or call your pharmacy and they will forward the refill request to us. Please allow 3 business days for your refill to be completed.          Additional Information About Your Visit        Safety [place]ound[employer] Information     Welltheon lets you send messages to your doctor, view your test results, renew your prescriptions, schedule appointments and more. To sign up, go to [email or web address] . Click on \"Log in\" on the left side of the screen, which will take you to the Welcome page. Then click on \"Sign up Now\" on the right side of the page.     You will be asked to enter the access code listed below, as well as some personal information. Please follow the directions to create your username and password.   " "  Your access code is: [email or web address]  Expires: 2018 11:47 AM     Your access code will  in 90 days. If you need help or a new code, please call your [facility] clinic or [phone].        Care EveryWhere ID     This is your Care EveryWhere ID. This could be used by other organizations to access your [facility] medical records  [id number]        Your Vitals Were     Pulse Temperature Height Pulse Oximetry BMI (Body Mass Index)       117 99.2  F (37.3  C) (Tympanic) 5' 3\" (1.6 m) 97% 35.69 kg/m2        Blood Pressure from Last 3 Encounters:   18 112/64   18 129/78   18 110/68    Weight from Last 3 Encounters:   18 201 lb 8 oz (91.4 kg)   18 209 lb (94.8 kg)   18 209 lb (94.8 kg)              We Performed the Following     *UA reflex to Microscopic and Culture ([facility] and [facility] (except [place] and [place])     Albumin Random Urine Quantitative with Creat Ratio     Albumin Random Urine Quantitative with Creat Ratio     Basic metabolic panel     CBC with platelets differential     Creatinine     Hemoglobin A1c     Lipid panel reflex to direct LDL Fasting     Lipid panel reflex to direct LDL Fasting          Today's Medication Changes          These changes are accurate as of 18 11:47 AM.  If you have any questions, ask your nurse or doctor.               These medicines have changed or have updated prescriptions.        Dose/Directions    metFORMIN 500 MG 24 hr tablet   Commonly known as:  GLUCOPHAGE-XR   This may have changed:  See the new instructions.   Used for:  Type 2 diabetes mellitus with diabetic neuropathy, without long-term current use of insulin (H)   Changed by:  Cadence Coreas APRN CNP        Dose:  500 mg   Take 1 tablet (500 mg) by mouth daily (with dinner)   Quantity:  90 tablet   Refills:  0       sitagliptin 50 MG tablet   Commonly known as:  JANUVIA   This may have changed:  See the new instructions.   Used for:  Type 2 " diabetes mellitus with diabetic neuropathy, without long-term current use of insulin (H)   Changed by:  Cadence Coreas APRN CNP        Dose:  50 mg   Take 1 tablet (50 mg) by mouth daily   Quantity:  90 tablet   Refills:  0            Where to get your medicines      These medications were sent to [facility] - [address]  [address], [facility] [address]     Phone:  [phone]     amLODIPine 10 MG tablet    blood glucose monitoring test strip    glimepiride 2 MG tablet    metFORMIN 500 MG 24 hr tablet    sitagliptin 50 MG tablet         Some of these will need a paper prescription and others can be bought over the counter.  Ask your nurse if you have questions.     Bring a paper prescription for each of these medications     order for DME                Primary Care Provider Office Phone # Fax #    Willis Chen [phone] [address] [place] [address]        Equal Access to Services     [facility]: Hadii nick main hadasho Sodianaali, waaxda luqadaha, qaybta kaalmada adeegyada, waxay allanin haycastro jewell . So [facility] [phone].    ATENCIÓN: Si habla español, tiene a rader disposición servicios gratuitos de asistencia lingüística. Ll[facility] [phone].    We comply with applicable federal civil rights laws and [place] laws. We do not discriminate on the basis of race, color, national origin, age, disability, sex, sexual orientation, or gender identity.            Thank you!     [facility] you [facility] your [facility]. Our goal is always to provide you with excellent care. Hearing back from our patients is one way we can continue to improve our services. Please take a few minutes to complete the written survey that you may receive in the mail after your visit with us. Thank you!             Your Updated Medication List - Protect others around you: Learn how to safely use,  store and throw away your medicines at www.disposemymeds.org.          This list is accurate as of 9/14/18 11:47 AM.  Always use your most recent med list.                   Brand Name Dispense Instructions for use Diagnosis    amLODIPine 10 MG tablet    NORVASC    90 tablet    TAKE 1 TABLET BY MOUTH  DAILY    Essential hypertension with goal blood pressure less than 140/90       ASPIRIN NOT PRESCRIBED    INTENTIONAL    0 each    1 each daily Antiplatelet medication not prescribed intentionally due to on Plavix    Type 2 diabetes mellitus with hyperglycemia (H)       atorvastatin 20 MG tablet    LIPITOR    90 tablet    Take 1 tablet (20 mg) by mouth daily    Hyperlipidemia with target LDL less than 100       blood glucose monitoring test strip    no brand specified    180 each    Use to test blood sugars 2 times daily or as directed    Type 2 diabetes mellitus with diabetic neuropathy, without long-term current use of insulin (H)       clopidogrel 75 MG tablet    PLAVIX    90 tablet    Take 1 tablet (75 mg) by mouth daily    Cerebrovascular accident (CVA), unspecified mechanism (H)       glimepiride 2 MG tablet    AMARYL    90 tablet    TAKE 1 TABLET BY MOUTH  EVERY MORNING (BEFORE  BREAKFAST)    Type 2 diabetes mellitus with diabetic neuropathy, without long-term current use of insulin (H)       losartan 50 MG tablet    COZAAR    90 tablet    Take 1 tablet (50 mg) by mouth daily    Essential hypertension with goal blood pressure less than 140/90       metFORMIN 500 MG 24 hr tablet    GLUCOPHAGE-XR    90 tablet    Take 1 tablet (500 mg) by mouth daily (with dinner)    Type 2 diabetes mellitus with diabetic neuropathy, without long-term current use of insulin (H)       order for DME     1 Units    Accu-check glucometer or per insurance coverage    Type 2 diabetes mellitus with diabetic neuropathy (H)       order for DME     180 Units    Accu-check glucometer test lancets, or per insurance coverage  Check QAM and QPM     Type 2 diabetes mellitus with diabetic neuropathy (H)       * order for DME     1 Units    Equipment being ordered: left AFO brace    Left foot drop       order for DME     2 Units    Equipment being ordered: knee high compression stockings, 30-40mmHg    Edema, unspecified type       * order for DME     1 Device    Seated walker with basket    Left hemiplegia (H)       order for DME     180 Units    Accu-check glucometer test strips or per insurance coverage  Check QAM and QPM    Type 2 diabetes mellitus with diabetic neuropathy, without long-term current use of insulin (H)       sitagliptin 50 MG tablet    JANUVIA    90 tablet    Take 1 tablet (50 mg) by mouth daily    Type 2 diabetes mellitus with diabetic neuropathy, without long-term current use of insulin (H)       tolterodine 4 MG 24 hr capsule    DETROL LA    30 capsule    Take 1 capsule (4 mg) by mouth daily    Mixed incontinence       * Notice:  This list has 2 medication(s) that are the same as other medications prescribed for you. Read the directions carefully, and ask your doctor or other care provider to review them with you.

## 2018-09-14 NOTE — Clinical Note
Hey! This patient will be formally transferring from DR. Chen to you. I'm working up her tachycardia today. She has a mild fever so I'm not going to do an EKG, etc. If her labs (UA, CBC) are normal I'll have her RTC for nurse only vitals check for next week, the OV if appropriate. Please keep this in mind for next visit.

## 2018-09-15 LAB
ANION GAP SERPL CALCULATED.3IONS-SCNC: 12 MMOL/L (ref 3–14)
BUN SERPL-MCNC: 28 MG/DL (ref 7–30)
CALCIUM SERPL-MCNC: 9.2 MG/DL (ref 8.5–10.1)
CHLORIDE SERPL-SCNC: 103 MMOL/L (ref 94–109)
CHOLEST SERPL-MCNC: 151 MG/DL
CO2 SERPL-SCNC: 22 MMOL/L (ref 20–32)
CREAT SERPL-MCNC: 1.67 MG/DL (ref 0.52–1.04)
CREAT UR-MCNC: 75 MG/DL
GFR SERPL CREATININE-BSD FRML MDRD: 33 ML/MIN/1.7M2
GLUCOSE SERPL-MCNC: 151 MG/DL (ref 70–99)
HDLC SERPL-MCNC: 46 MG/DL
LDLC SERPL CALC-MCNC: 76 MG/DL
MICROALBUMIN UR-MCNC: 748 MG/L
MICROALBUMIN/CREAT UR: 993.36 MG/G CR (ref 0–25)
NONHDLC SERPL-MCNC: 105 MG/DL
POTASSIUM SERPL-SCNC: 4 MMOL/L (ref 3.4–5.3)
SODIUM SERPL-SCNC: 137 MMOL/L (ref 133–144)
TRIGL SERPL-MCNC: 146 MG/DL
TSH SERPL DL<=0.005 MIU/L-ACNC: 3.56 MU/L (ref 0.4–4)

## 2018-09-17 ENCOUNTER — TELEPHONE (OUTPATIENT)
Dept: PEDIATRICS | Facility: CLINIC | Age: 48
End: 2018-09-17

## 2018-09-17 ENCOUNTER — ALLIED HEALTH/NURSE VISIT (OUTPATIENT)
Dept: NURSING | Facility: CLINIC | Age: 48
End: 2018-09-17
Payer: COMMERCIAL

## 2018-09-17 VITALS
OXYGEN SATURATION: 99 % | SYSTOLIC BLOOD PRESSURE: 124 MMHG | HEART RATE: 101 BPM | WEIGHT: 197.3 LBS | DIASTOLIC BLOOD PRESSURE: 70 MMHG | BODY MASS INDEX: 34.95 KG/M2

## 2018-09-17 DIAGNOSIS — R00.0 TACHYCARDIA: Primary | ICD-10-CM

## 2018-09-17 PROCEDURE — 99207 ZZC NO CHARGE NURSE ONLY: CPT

## 2018-09-17 NOTE — TELEPHONE ENCOUNTER
When pt was in the clinic to check her BP, I have went over her lab results in person, handed a copy of the lab results to her. Pt haven't been drinking fluids that much, so advised her to push fluids for the next 2 days & recheck BMP & CBC on Wed. Scheduled a non-fasting lab-only appointment on 9/19. Called [facility] to get the recent lab results. Pt's LOV with her nephrologist was on 8/21 & we have received those OV notes & lab results already, no OV or labs after 8/21/18 through them.     I hand faxed the lab results to [facility](Dr.Jonathan Winter) at [phone] with a note that pt need a f/u in couple of weeks.     TC/MA: Please advise pt to schedule an appointment with her nephrologist() in the next couple of weeks. Thanks.     Cadence Coreas's recommendation on plan of care today:  Ok, that means they are much lower than previously. Can you please fax them to the nephrologist, with a note asking them to fit the patient in within the next few weeks? Then please let the patient know she should be seen by them within the next few weeks. If unable to see them should have kidney function rechecked here. I believe I ordered it    Lab result notes from Cadence Coreas NP on 9/17:  Please call patient.  GFR has dropped, creat has spiked. I know she JUST saw nephrology but this is worse than her labs there I believe    1. Can she come in and re-check? Not fasting. Needs to be well hydrated. I ordered labs.   2. Does she have the kidney results from the kidney doctors 2 weeks ago? What did the doctor say about them? Would be helpful if you could have [facility] fax them over.    Inderjit, RN  Triage Nurse

## 2018-09-17 NOTE — MR AVS SNAPSHOT
"              After Visit Summary   2018    Kelly Polk    MRN: [id number]           Patient Information     Date Of Birth          1970        Visit Information        Provider Department      2018 1:45 PM CHECO NURSE AB [facility]        Today's Diagnoses     Tachycardia    -  1       Follow-ups after your visit        Who to contact     If you have questions or need follow up information about today's clinic visit or your schedule please contact [facility] directly at [phone].  Normal or non-critical lab and imaging results will be communicated to you by MyChart, letter or phone within 4 business days after the clinic has received the results. If you do not hear from us within 7 days, please contact the clinic through [employer]Yoh[employer] or phone. If you have a critical or abnormal lab result, we will notify you by phone as soon as possible.  Submit refill requests through [employer] or call your pharmacy and they will forward the refill request to us. Please allow 3 business days for your refill to be completed.          Additional Information About Your Visit        MyChart Information     [employer] lets you send messages to your doctor, view your test results, renew your prescriptions, schedule appointments and more. To sign up, go to [email or web address] . Click on \"Log in\" on the left side of the screen, which will take you to the Welcome page. Then click on \"Sign up Now\" on the right side of the page.     You will be asked to enter the access code listed below, as well as some personal information. Please follow the directions to create your username and password.     Your access code is: [email or web address]  Expires: 2018 11:47 AM     Your access code will  in 90 days. If you need help or a new code, please call your [facility] or [phone].        Care EveryWhere ID     This is your Care EveryWhere ID. This could be used by other organizations to access your " [place] medical records  [id number]        Your Vitals Were     Pulse Pulse Oximetry BMI (Body Mass Index)             101 99% 34.95 kg/m2          Blood Pressure from Last 3 Encounters:   09/17/18 124/70   09/14/18 112/64   06/08/18 129/78    Weight from Last 3 Encounters:   09/17/18 197 lb 4.8 oz (89.5 kg)   09/14/18 201 lb 8 oz (91.4 kg)   06/08/18 209 lb (94.8 kg)              Today, you had the following     No orders found for display       Primary Care Provider Office Phone # Fax #    Willis Chen [phone] [address] [place] [address]        Equal Access to Services     [facility]: Hadii nick hamptono Sojefferson, waaxda luqadaha, qaybta kaalmada adeegyada, waxkiki jewell . So [facility] [phone].    ATENCIÓN: Si habla español, tiene a rader disposición servicios gratuitos de asistencia lingüística. Ll[facility] [phone].    We comply with applicable federal civil rights laws and [place] laws. We do not discriminate on the basis of race, color, national origin, age, disability, sex, sexual orientation, or gender identity.            Thank you!     Thank you for choosing [facility]  for your care. Our goal is always to provide you with excellent care. Hearing back from our patients is one way we can continue to improve our services. Please take a few minutes to complete the written survey that you may receive in the mail after your visit with us. Thank you!             Your Updated Medication List - Protect others around you: Learn how to safely use, store and throw away your medicines at www.disposemymeds.org.          This list is accurate as of 9/17/18  2:19 PM.  Always use your most recent med list.                   Brand Name Dispense Instructions for use Diagnosis    amLODIPine 10 MG tablet    NORVASC    90 tablet    TAKE 1 TABLET BY MOUTH  DAILY    Essential hypertension with goal blood pressure less than 140/90        ASPIRIN NOT PRESCRIBED    INTENTIONAL    0 each    1 each daily Antiplatelet medication not prescribed intentionally due to on Plavix    Type 2 diabetes mellitus with hyperglycemia (H)       atorvastatin 20 MG tablet    LIPITOR    90 tablet    Take 1 tablet (20 mg) by mouth daily    Hyperlipidemia with target LDL less than 100       blood glucose monitoring test strip    no brand specified    180 each    Use to test blood sugars 2 times daily or as directed    Type 2 diabetes mellitus with diabetic neuropathy, without long-term current use of insulin (H)       clopidogrel 75 MG tablet    PLAVIX    90 tablet    Take 1 tablet (75 mg) by mouth daily    Cerebrovascular accident (CVA), unspecified mechanism (H)       glimepiride 2 MG tablet    AMARYL    90 tablet    TAKE 1 TABLET BY MOUTH  EVERY MORNING (BEFORE  BREAKFAST)    Type 2 diabetes mellitus with diabetic neuropathy, without long-term current use of insulin (H)       losartan 50 MG tablet    COZAAR    90 tablet    Take 1 tablet (50 mg) by mouth daily    Essential hypertension with goal blood pressure less than 140/90       metFORMIN 500 MG 24 hr tablet    GLUCOPHAGE-XR    90 tablet    Take 1 tablet (500 mg) by mouth daily (with dinner)    Type 2 diabetes mellitus with diabetic neuropathy, without long-term current use of insulin (H)       order for DME     1 Units    Accu-check glucometer or per insurance coverage    Type 2 diabetes mellitus with diabetic neuropathy (H)       order for DME     180 Units    Accu-check glucometer test lancets, or per insurance coverage  Check QAM and QPM    Type 2 diabetes mellitus with diabetic neuropathy (H)       * order for DME     1 Units    Equipment being ordered: left AFO brace    Left foot drop       order for DME     2 Units    Equipment being ordered: knee high compression stockings, 30-40mmHg    Edema, unspecified type       * order for DME     1 Device    Seated walker with basket    Left hemiplegia (H)       order  for DME     180 Units    Accu-check glucometer test strips or per insurance coverage  Check QAM and QPM    Type 2 diabetes mellitus with diabetic neuropathy, without long-term current use of insulin (H)       sitagliptin 50 MG tablet    JANUVIA    90 tablet    Take 1 tablet (50 mg) by mouth daily    Type 2 diabetes mellitus with diabetic neuropathy, without long-term current use of insulin (H)       tolterodine 4 MG 24 hr capsule    DETROL LA    30 capsule    Take 1 capsule (4 mg) by mouth daily    Mixed incontinence       * Notice:  This list has 2 medication(s) that are the same as other medications prescribed for you. Read the directions carefully, and ask your doctor or other care provider to review them with you.

## 2018-09-17 NOTE — PROGRESS NOTES
Pt had a pulse rate of 101 today.    Annie Gregory CMA 9/17/2018 2:14 PM   Kelly Polk is a 47 year old patient who comes in today for a Blood Pressure check.  Initial BP:  /70  Pulse 101  Wt 197 lb 4.8 oz (89.5 kg)  SpO2 99%  BMI 34.95 kg/m2     101  Disposition: results routed to provider

## 2018-09-17 NOTE — TELEPHONE ENCOUNTER
Patient was in the clinic this afternoon, and spoke with Debora PERDOMO RN regarding lab results and recommendations.  Please refer to result note.  LATISHA Cruz RN

## 2018-09-17 NOTE — TELEPHONE ENCOUNTER
Reason for call:  Other   Patient called regarding (reason for call): call back and returning call  Additional comments: patient returning a call from Cadence Coreas's nurse. Please call her back after 1pm.    Phone number to reach patient:  Home number on file [phone] (home)    Best Time:  any    Can we leave a detailed message on this number?  NO

## 2018-09-18 NOTE — TELEPHONE ENCOUNTER
Called patient and spoke with her and informed her to schedule a follow up visit with her Nephrologist in the next few weeks. Patient will schedule the follow up.

## 2018-09-19 DIAGNOSIS — E11.40 TYPE 2 DIABETES MELLITUS WITH DIABETIC NEUROPATHY, WITHOUT LONG-TERM CURRENT USE OF INSULIN (H): ICD-10-CM

## 2018-09-19 DIAGNOSIS — R00.0 TACHYCARDIA: ICD-10-CM

## 2018-09-19 LAB
ERYTHROCYTE [DISTWIDTH] IN BLOOD BY AUTOMATED COUNT: 13.7 % (ref 10–15)
HCT VFR BLD AUTO: 37.7 % (ref 35–47)
HGB BLD-MCNC: 12.2 G/DL (ref 11.7–15.7)
MCH RBC QN AUTO: 25.8 PG (ref 26.5–33)
MCHC RBC AUTO-ENTMCNC: 32.4 G/DL (ref 31.5–36.5)
MCV RBC AUTO: 80 FL (ref 78–100)
PLATELET # BLD AUTO: 387 10E9/L (ref 150–450)
RBC # BLD AUTO: 4.73 10E12/L (ref 3.8–5.2)
WBC # BLD AUTO: 13.8 10E9/L (ref 4–11)

## 2018-09-19 PROCEDURE — 80048 BASIC METABOLIC PNL TOTAL CA: CPT | Performed by: NURSE PRACTITIONER

## 2018-09-19 PROCEDURE — 85027 COMPLETE CBC AUTOMATED: CPT | Performed by: NURSE PRACTITIONER

## 2018-09-19 PROCEDURE — 36415 COLL VENOUS BLD VENIPUNCTURE: CPT | Performed by: NURSE PRACTITIONER

## 2018-09-20 LAB
ANION GAP SERPL CALCULATED.3IONS-SCNC: 10 MMOL/L (ref 3–14)
BUN SERPL-MCNC: 22 MG/DL (ref 7–30)
CALCIUM SERPL-MCNC: 8.9 MG/DL (ref 8.5–10.1)
CHLORIDE SERPL-SCNC: 105 MMOL/L (ref 94–109)
CO2 SERPL-SCNC: 21 MMOL/L (ref 20–32)
CREAT SERPL-MCNC: 1.44 MG/DL (ref 0.52–1.04)
GFR SERPL CREATININE-BSD FRML MDRD: 39 ML/MIN/1.7M2
GLUCOSE SERPL-MCNC: 140 MG/DL (ref 70–99)
POTASSIUM SERPL-SCNC: 3.8 MMOL/L (ref 3.4–5.3)
SODIUM SERPL-SCNC: 136 MMOL/L (ref 133–144)

## 2018-10-10 NOTE — TELEPHONE ENCOUNTER
.Reason for Call:  Other clarification    Detailed comments: pt is calling she needs clarification of who is PCP is.    Phone Number Patient can be reached at: Home number on file [phone] (home)    Best Time: any    Can we leave a detailed message on this number? YES    Call taken on 9/5/2018 at 12:37 PM by Shanti Rapp      
Please see refill encounter from 8/27/18. This was addressed today.     Deborah Lino RN -- [facility]       
ED

## 2018-11-02 DIAGNOSIS — E11.40 TYPE 2 DIABETES MELLITUS WITH DIABETIC NEUROPATHY, WITHOUT LONG-TERM CURRENT USE OF INSULIN (H): ICD-10-CM

## 2018-11-06 RX ORDER — METFORMIN HCL 500 MG
TABLET, EXTENDED RELEASE 24 HR ORAL
Qty: 90 TABLET | Refills: 1 | Status: SHIPPED | OUTPATIENT
Start: 2018-11-06

## 2018-11-06 RX ORDER — SITAGLIPTIN 50 MG/1
TABLET, FILM COATED ORAL
Qty: 90 TABLET | Refills: 1 | Status: SHIPPED | OUTPATIENT
Start: 2018-11-06

## 2018-11-06 RX ORDER — GLIMEPIRIDE 2 MG/1
TABLET ORAL
Qty: 90 TABLET | Refills: 1 | Status: SHIPPED | OUTPATIENT
Start: 2018-11-06

## 2018-11-06 NOTE — TELEPHONE ENCOUNTER
Pt should have enough med to last till 12/14/18. Refill request is from the mail order pharmacy. Please review & sign, after reviewing pt's creatinine. Thanks.      Metformin 500 mg qd  Last Written Prescription Date:  9/14/18  Last Fill Quantity: 90,  # refills: 0   Glimepiride 2 mg qam  Last Written Prescription Date:  9/14/18  Last Fill Quantity: 90,  # refills: 0   Januvia 50 mg qd  Last Written Prescription Date:  9/14/18  Last Fill Quantity: 90,  # refills: 0   Last office visit: 9/14/2018 with prescribing provider:  Cadence Coreas CNP   Future Office Visit:      Component      Latest Ref Rng & Units 3/27/2017 6/29/2017 9/14/2018 9/19/2018   Creatinine      0.52 - 1.04 mg/dL 1.27 (H) 1.33 (H) 1.67 (H) 1.44 (H)     Inderjit, RN  Triage Nurse

## 2019-03-12 ENCOUNTER — TRANSFERRED RECORDS (OUTPATIENT)
Dept: HEALTH INFORMATION MANAGEMENT | Facility: CLINIC | Age: 49
End: 2019-03-12

## 2019-05-08 DIAGNOSIS — I10 ESSENTIAL HYPERTENSION WITH GOAL BLOOD PRESSURE LESS THAN 140/90: ICD-10-CM

## 2019-05-08 NOTE — TELEPHONE ENCOUNTER
"Requested Prescriptions   Pending Prescriptions Disp Refills     amLODIPine (NORVASC) 10 MG tablet [Pharmacy Med Name: AMLODIPINE  10MG  TAB] 90 tablet 1     Sig: TAKE 1 TABLET BY MOUTH  DAILY  Last Written Prescription Date:  09/14/2018  Last Fill Quantity: 90 tablet,  # refills: 1    Last office visit: 9/14/2018 with prescribing provider:  Cadence Coreas APRN CNP        Future Office Visit:           Calcium Channel Blockers Protocol  Failed - 5/8/2019  6:48 AM        Failed - Normal serum creatinine on file in past 12 months     Recent Labs   Lab Test 09/19/18  1130  02/18/16  1543   CR 1.44*   < >  --    CREAT  --   --  1.2*    < > = values in this interval not displayed.           Passed - Blood pressure under 140/90 in past 12 months     BP Readings from Last 3 Encounters:   09/17/18 124/70   09/14/18 112/64   06/08/18 129/78           Passed - Recent (12 mo) or future (30 days) visit within the authorizing provider's specialty     Patient had office visit in the last 12 months or has a visit in the next 30 days with authorizing provider or within the authorizing provider's specialty.  See \"Patient Info\" tab in inbasket, or \"Choose Columns\" in Meds & Orders section of the refill encounter.            Passed - Medication is active on med list        Passed - Patient is age 18 or older        Passed - No active pregnancy on record        Passed - No positive pregnancy test in past 12 months      Routing refill request to provider for review/approval because:  Labs out of range: CR    T'd up 1 month for provider review.    Maris Garcia RN'          "

## 2019-05-09 RX ORDER — AMLODIPINE BESYLATE 10 MG/1
TABLET ORAL
Qty: 90 TABLET | Refills: 0 | Status: SHIPPED | OUTPATIENT
Start: 2019-05-09

## 2019-08-09 ENCOUNTER — TELEPHONE (OUTPATIENT)
Dept: PEDIATRICS | Facility: CLINIC | Age: 49
End: 2019-08-09

## 2019-08-09 NOTE — TELEPHONE ENCOUNTER
Panel Management Review      Patient has the following on her problem list:     Diabetes    ASA: Passed    Last A1C  Lab Results   Component Value Date    A1C 6.7 09/14/2018    A1C 6.8 04/17/2018    A1C 5.7 06/29/2017    A1C 6.2 03/27/2017    A1C 6.2 09/28/2016     A1C tested: Passed    Last LDL:    Lab Results   Component Value Date    CHOL 151 09/14/2018     Lab Results   Component Value Date    HDL 46 09/14/2018     Lab Results   Component Value Date    LDL 76 09/14/2018     Lab Results   Component Value Date    TRIG 146 09/14/2018     Lab Results   Component Value Date    CHOLHDLRATIO 3.3 08/12/2016     Lab Results   Component Value Date    NHDL 105 09/14/2018       Is the patient on a Statin? YES             Is the patient on Aspirin? NO - intentional    Medications     HMG CoA Reductase Inhibitors     atorvastatin (LIPITOR) 20 MG tablet             Last three blood pressure readings:  BP Readings from Last 3 Encounters:   09/17/18 124/70   09/14/18 112/64   06/08/18 129/78       Date of last diabetes office visit: 9/16/18     Tobacco History:     History   Smoking Status     Never Smoker   Smokeless Tobacco     Never Used         Hypertension   Last three blood pressure readings:  BP Readings from Last 3 Encounters:   09/17/18 124/70   09/14/18 112/64   06/08/18 129/78     Blood pressure: Passed    HTN Guidelines:  Less than 140/90      Composite cancer screening  Chart review shows that this patient is due/due soon for the following None  Summary:    Patient is due/failing the following:   A1C, FOLLOW UP and PHYSICAL    Action needed:   Patient needs office visit for physical, Diabetes, HTN and thyroid f/u.    Type of outreach:    Sent letter.    Questions for provider review:    None                                                                                                                                  Svetlana Bolivar CMA    Chart routed to Care Team .

## 2019-08-09 NOTE — LETTER
August 9, 2019      Kelly Polk  [facility]  [place] [address]        Dear Kelly,     According to our records your last visit with your provider was September 2018. You are due for an office visit so that we can continue to refill your medications.    We care about your health and have reviewed your health plan including your medical conditions, medications, and lab results.  Based on this review, it is recommended that you follow up regarding the following health topic(s):  -Diabetes  -High Blood Pressure  -Wellness (Physical) Visit   -Thyroid    We recommend you take the following action(s):  -schedule a WELLNESS (Physical) APPOINTMENT.  We will perform the following labs: A1c and Lipids (fasting cholesterol - nothing to eat except water and/or meds for 8-10 hours).     Please call us at the [facility] - [phone] (or use [employer]) to address the above recommendations.     Thank you for trusting [facility] and we appreciate the opportunity to serve you.  We look forward to supporting your healthcare needs in the future.    Healthy Regards,    Your Health Care Team  [facility]

## 2019-08-20 NOTE — TELEPHONE ENCOUNTER
Called and left VM for patient to contact clinic.  Patient needs physical and diabetes follow up with pre-visit labs.  Svetlana Bolivar, CMA

## 2019-08-23 NOTE — TELEPHONE ENCOUNTER
CALLED and spoke with patient.  Patient states she would need to arrange transportation. Will call back to schedule appointment.  States she has moved to [place].    Svetlana Bolivar, CMA

## 2019-10-07 DIAGNOSIS — E11.40 TYPE 2 DIABETES MELLITUS WITH DIABETIC NEUROPATHY, WITHOUT LONG-TERM CURRENT USE OF INSULIN (H): ICD-10-CM

## 2019-10-08 NOTE — TELEPHONE ENCOUNTER
Test strips  Routing refill request to provider for review/approval because:  Patient needs to be seen because:  Overdue for DM OV    Priscilla Lang, RN, BSN

## 2020-11-05 NOTE — LETTER
6/8/2018       RE: Kelly Polk  [address]  [place] [address]     Dear Colleague,    Thank you for referring your patient, Kelly Polk, to the [facility] [place] [facility]. Please see a copy of my visit note below.    Kelly Polk is a pleasant 47-year-old black female who has had recurrent Klebsiella urinary tract infections and was recently treated for Ureaplasma. She's had 2 strokes and since her stroke she's had both stress incontinence and urge incontinence.  Her renal ultrasound shows no hydronephrosis and no stones. Her urinalysis is normal today  Other past medical history: HPV, diabetes, hypertension, hyperlipidemia, left hemiplegia, nonsmoker  Medications: Norvasc, Lipitor, Plavix, glimepiride, Januvia, losartan, metformin  Allergies: None  Exam: Alert and oriented, normal appearance, normal vital signs. Normal external genitalia and urethral meatus  Flexible cystoscopy-normal urethral mucosa, no diverticulum, loss of bladder neck angle. Cobblestoning of mucosa with no tumors or raised erythema. Normal ureteral orifices, some squamous metaplasia of trigone, no diverticulum or fistula  Assessment: Recurrent urinary tract infections secondary to bacterial colonization, chronic cystitis, mixed urinary incontinence  Plan: Cipro 500 mg ×1 today. Trial of tolterodine 4 mg daily, see Dr. Hernandez for follow-up of mixed urinary incontinence    Again, thank you for allowing me to participate in the care of your patient.      Sincerely,    Jimmy Kennedy MD       Due to insurance changes, patient will have to switch to Nord[facility]ropin and [facility]. I called mom and went over the info and gave her [facility]'s number to reach out to and speed up their process(if possible)      Please send new Rx for Norditropin Flexpro 10 mg/1.5 mL - 0.7 mg once daily - Qty: 3 mL per 28 days.    Faxing [facility] to the [facility] for enrollment. Mom has my number to call in case any questions.